# Patient Record
Sex: FEMALE | Race: OTHER | URBAN - METROPOLITAN AREA
[De-identification: names, ages, dates, MRNs, and addresses within clinical notes are randomized per-mention and may not be internally consistent; named-entity substitution may affect disease eponyms.]

---

## 2017-04-29 ENCOUNTER — HOSPITAL ENCOUNTER (OUTPATIENT)
Dept: MAMMOGRAPHY | Age: 57
Discharge: HOME | End: 2017-04-29
Attending: FAMILY MEDICINE
Payer: COMMERCIAL

## 2017-04-29 DIAGNOSIS — Z12.31 ENCOUNTER FOR SCREENING MAMMOGRAM FOR MALIGNANT NEOPLASM OF BREAST: ICD-10-CM

## 2017-04-29 PROCEDURE — 77067 SCR MAMMO BI INCL CAD: CPT

## 2017-05-16 ENCOUNTER — HOSPITAL ENCOUNTER (OUTPATIENT)
Dept: MAMMOGRAPHY | Facility: HOSPITAL | Age: 57
Discharge: HOME | End: 2017-05-16
Attending: FAMILY MEDICINE
Payer: COMMERCIAL

## 2017-05-16 DIAGNOSIS — R92.8 ABNORMAL MAMMOGRAM: ICD-10-CM

## 2017-05-16 DIAGNOSIS — R92.8 ABNORMAL MAMMOGRAM OF LEFT BREAST: ICD-10-CM

## 2017-05-16 PROCEDURE — 77065 DX MAMMO INCL CAD UNI: CPT | Performed by: FAMILY MEDICINE

## 2017-05-16 PROCEDURE — 77061 BREAST TOMOSYNTHESIS UNI: CPT | Performed by: FAMILY MEDICINE

## 2017-05-16 PROCEDURE — 76642 ULTRASOUND BREAST LIMITED: CPT | Performed by: FAMILY MEDICINE

## 2018-03-13 ENCOUNTER — HOSPITAL (OUTPATIENT)
Dept: OTHER | Age: 58
End: 2018-03-13
Attending: FAMILY MEDICINE

## 2018-04-02 ENCOUNTER — HOSPITAL (OUTPATIENT)
Dept: OTHER | Age: 58
End: 2018-04-02
Attending: FAMILY MEDICINE

## 2024-05-03 ENCOUNTER — HOSPITAL ENCOUNTER (OUTPATIENT)
Age: 64
Discharge: HOME | End: 2024-05-03
Payer: COMMERCIAL

## 2024-05-03 VITALS
OXYGEN SATURATION: 99 % | HEART RATE: 93 BPM | SYSTOLIC BLOOD PRESSURE: 94 MMHG | RESPIRATION RATE: 18 BRPM | TEMPERATURE: 98 F | WEIGHT: 236 LBS | DIASTOLIC BLOOD PRESSURE: 75 MMHG | BODY MASS INDEX: 39 KG/M2

## 2024-05-03 DIAGNOSIS — N76.2 CELLULITIS OF LABIA: Primary | ICD-10-CM

## 2024-05-03 PROCEDURE — 99204 OFFICE O/P NEW MOD 45 MIN: CPT

## 2024-05-03 PROCEDURE — 99203 OFFICE O/P NEW LOW 30 MIN: CPT

## 2024-05-03 RX ORDER — SULFAMETHOXAZOLE AND TRIMETHOPRIM 800; 160 MG/1; MG/1
1 TABLET ORAL 2 TIMES DAILY
Qty: 14 TABLET | Refills: 0 | Status: SHIPPED | OUTPATIENT
Start: 2024-05-03 | End: 2024-05-10

## 2024-05-03 RX ORDER — ATORVASTATIN CALCIUM 20 MG/1
20 TABLET, FILM COATED ORAL NIGHTLY
COMMUNITY
Start: 2023-11-24

## 2024-05-03 RX ORDER — CEPHALEXIN 500 MG/1
500 CAPSULE ORAL 4 TIMES DAILY
Qty: 28 CAPSULE | Refills: 0 | Status: SHIPPED | OUTPATIENT
Start: 2024-05-03 | End: 2024-05-10

## 2024-05-03 NOTE — DISCHARGE INSTRUCTIONS
Please take both antibiotics as prescribed.  Warm sits baths and Epsom salt.  Have a wound check with your primary in 3 days.  ER if worse.

## 2024-05-03 NOTE — ED PROVIDER NOTES
Patient Seen in: [facility]      History     Chief Complaint   Patient presents with    Eval-G     Stated Complaint: Eval - G    Subjective:   This is a 63-year-old female with a history of type 2 diabetes, GERD, and hyperlipidemia.  Presents to immediate care for painful bump on her left labia.  Reports she notices painful bump and use an over-the-counter cream.  She attempted to squeeze the area.  Pain and redness became worse.  Noted some brown watery drainage after squeezing.  No fevers.  No vaginal bleeding or vaginal discharge.  No concern for STI.  No other treatment attempted prior to arrival.     The history is provided by the patient. The history is limited by a language barrier. A  was used.           Objective:   Past Medical History:    Diabetes (HCC)    Esophageal reflux    Hyperlipidemia              Past Surgical History:   Procedure Laterality Date    Cholecystectomy      Thyroidectomy                  Social History     Socioeconomic History    Marital status:    Tobacco Use    Smoking status: Never    Smokeless tobacco: Never   Vaping Use    Vaping status: Never Used   Substance and Sexual Activity    Alcohol use: Never    Drug use: Never     Social Determinants of Health     Financial Resource Strain: Not on File (10/7/2022)    Received from [facility]     Financial Resource Strain     Financial Resource Strain: 0   Food Insecurity: Declined (11/22/2023)    Received from [facility]     Food Insecurity     Food: 99   Transportation Needs: Not on File (10/7/2022)    Received from [facility]     Transportation Needs     Transportation: 0   Physical Activity: Not on File (10/7/2022)    Received from [facility]     Physical Activity     Physical Activity: 0   Stress: Not on File (10/7/2022)    Received from [facility]     Stress     Stress: 0   Social Connections: Not on File (10/7/2022)    Received from [facility]     Social Connections     Social Connections and Isolation: 0   Housing  Stability: Declined (2023)    [facility]     [facility] [place] Stability     Housin              Review of Systems   Constitutional:  Negative for chills and fever.   HENT:  Negative for congestion and sore throat.    Respiratory:  Negative for cough, shortness of breath and wheezing.    Cardiovascular:  Negative for chest pain, palpitations and leg swelling.   Gastrointestinal:  Negative for abdominal pain, constipation, diarrhea, nausea and vomiting.   Genitourinary:  Negative for dysuria, frequency, hematuria, vaginal bleeding and vaginal discharge.   Musculoskeletal:  Negative for back pain, neck pain and neck stiffness.   Skin:  Positive for wound.   Allergic/Immunologic: Negative for environmental allergies.   Neurological:  Negative for headaches.   Hematological:  Does not bruise/bleed easily.       Positive for stated complaint: Eval - G  Other systems are as noted in HPI.  Constitutional and vital signs reviewed.      All other systems reviewed and negative except as noted above.    Physical Exam     ED Triage Vitals [24 1022]   BP 94/75   Pulse 93   Resp 18   Temp 98.1 °F (36.7 °C)   Temp src Temporal   SpO2 99 %   O2 Device None (Room air)       Current:BP 94/75   Pulse 93   Temp 98.1 °F (36.7 °C) (Temporal)   Resp 18   Wt 107 kg   SpO2 99%   BMI 39.27 kg/m²         Physical Exam  Vitals and nursing note reviewed. Exam conducted with a chaperone present.   Constitutional:       General: She is not in acute distress.     Appearance: Normal appearance. She is obese. She is not ill-appearing, toxic-appearing or diaphoretic.   HENT:      Head: Normocephalic and atraumatic.      Right Ear: External ear normal.      Left Ear: External ear normal.      Nose: Nose normal.      Mouth/Throat:      Mouth: Mucous membranes are moist.      Pharynx: Oropharynx is clear.   Eyes:      General:         Right eye: No discharge.         Left eye: No discharge.      Extraocular Movements: Extraocular  movements intact.      Conjunctiva/sclera: Conjunctivae normal.   Cardiovascular:      Rate and Rhythm: Normal rate.   Pulmonary:      Effort: Pulmonary effort is normal.   Genitourinary:     Exam position: Supine.      Pubic Area: No rash or pubic lice.       Labia:         Right: No rash, tenderness, lesion or injury.         Left: Tenderness present. No rash, lesion or injury.        Musculoskeletal:      Cervical back: Neck supple.      Right lower leg: No edema.      Left lower leg: No edema.   Skin:     General: Skin is warm and dry.      Capillary Refill: Capillary refill takes less than 2 seconds.      Findings: No rash.   Neurological:      Mental Status: She is alert and oriented to person, place, and time.   Psychiatric:         Mood and Affect: Mood normal.         Behavior: Behavior normal.               ED Course   Labs Reviewed - No data to display          DC home.          MDM      Vital signs stable.  Patient is well-appearing and nontoxic looking.  Presents to immediate care for left labial pain and swelling.    Differential diagnosis includes but is not limited to Bartholin's gland cyst, cellulitis, lesion, HSV    Chaperoned exam by nursing staff.  There is what appears to be a developing cellulitis on the left labia.  No discrete abscess.  No drainage from the area.  Patient states she has no new sexual partners or concern for STI.    DC home.  Dual antibiotic therapy prescribed.  Recommended warm sitz bath.  Close follow-up with her primary in 3 days for wound check.  Reasons to seek emergent reevaluation reviewed.    Assessment, plan of care, and all discharge instructions personally discussed with patient via hospital certified .  All questions answered.                             Medical Decision Making      Disposition and Plan     Clinical Impression:  1. Cellulitis of labia         Disposition:  Discharge  5/3/2024 10:59 am    Follow-up:  Tawny Romo,  MD  [address] [place] [id number]  [phone]    In 3 days  For wound re-check          Medications Prescribed:  Current Discharge Medication List        START taking these medications    Details   sulfamethoxazole-trimethoprim -160 MG Oral Tab per tablet Take 1 tablet by mouth 2 (two) times daily for 7 days.  Qty: 14 tablet, Refills: 0      cephalexin 500 MG Oral Cap Take 1 capsule (500 mg total) by mouth 4 (four) times daily for 7 days.  Qty: 28 capsule, Refills: 0

## 2024-05-03 NOTE — ED INITIAL ASSESSMENT (HPI)
C/o onset Monday bump on vagina/labial area. States \"applied over the counter cream brought from [facility] [employer] and tried squeezing,  a lot of brownish pus came out and watery blood.\"

## 2024-06-08 ENCOUNTER — OFFICE VISIT (OUTPATIENT)
Dept: INTERNAL MEDICINE CLINIC | Facility: CLINIC | Age: 64
End: 2024-06-08
Payer: COMMERCIAL

## 2024-06-08 VITALS
SYSTOLIC BLOOD PRESSURE: 128 MMHG | WEIGHT: 241.81 LBS | RESPIRATION RATE: 15 BRPM | DIASTOLIC BLOOD PRESSURE: 70 MMHG | HEART RATE: 74 BPM | HEIGHT: 63.9 IN | OXYGEN SATURATION: 96 % | BODY MASS INDEX: 41.79 KG/M2 | TEMPERATURE: 99 F

## 2024-06-08 DIAGNOSIS — E78.5 HYPERLIPIDEMIA, UNSPECIFIED HYPERLIPIDEMIA TYPE: ICD-10-CM

## 2024-06-08 DIAGNOSIS — Z23 IMMUNIZATION DUE: ICD-10-CM

## 2024-06-08 DIAGNOSIS — Z12.31 ENCOUNTER FOR SCREENING MAMMOGRAM FOR MALIGNANT NEOPLASM OF BREAST: ICD-10-CM

## 2024-06-08 DIAGNOSIS — Z12.11 COLON CANCER SCREENING: ICD-10-CM

## 2024-06-08 DIAGNOSIS — M25.562 PAIN IN BOTH KNEES, UNSPECIFIED CHRONICITY: ICD-10-CM

## 2024-06-08 DIAGNOSIS — Z01.89 ROUTINE LAB DRAW: ICD-10-CM

## 2024-06-08 DIAGNOSIS — M25.561 PAIN IN BOTH KNEES, UNSPECIFIED CHRONICITY: ICD-10-CM

## 2024-06-08 DIAGNOSIS — E28.319 EARLY MENOPAUSE: ICD-10-CM

## 2024-06-08 DIAGNOSIS — E11.65 TYPE 2 DIABETES MELLITUS WITH HYPERGLYCEMIA, UNSPECIFIED WHETHER LONG TERM INSULIN USE (HCC): Primary | ICD-10-CM

## 2024-06-08 DIAGNOSIS — H91.91 HEARING DECREASED, RIGHT: ICD-10-CM

## 2024-06-08 DIAGNOSIS — M17.10 ARTHRITIS OF KNEE: ICD-10-CM

## 2024-06-08 PROBLEM — K04.7 PERIAPICAL ABSCESS WITHOUT SINUS: Status: RESOLVED | Noted: 2020-03-18 | Resolved: 2024-06-08

## 2024-06-08 PROBLEM — R14.0 ABDOMINAL BLOATING: Status: RESOLVED | Noted: 2022-07-21 | Resolved: 2024-06-08

## 2024-06-08 PROBLEM — H93.11 TINNITUS OF RIGHT EAR: Status: ACTIVE | Noted: 2021-02-10

## 2024-06-08 PROBLEM — M19.90 OSTEOARTHROSIS: Status: ACTIVE | Noted: 2020-05-30

## 2024-06-08 PROBLEM — K52.9 INFLAMMATION OF STOMACH AND INTESTINE: Status: RESOLVED | Noted: 2022-07-21 | Resolved: 2024-06-08

## 2024-06-08 PROBLEM — H91.8X1 OTHER SPECIFIED HEARING LOSS, RIGHT EAR: Status: ACTIVE | Noted: 2021-02-10

## 2024-06-08 PROBLEM — R03.0 ELEVATED BLOOD-PRESSURE READING, WITHOUT DIAGNOSIS OF HYPERTENSION: Status: ACTIVE | Noted: 2020-03-18

## 2024-06-08 PROBLEM — R14.0 ABDOMINAL BLOATING: Status: ACTIVE | Noted: 2022-07-21

## 2024-06-08 PROBLEM — R03.0 ELEVATED BLOOD-PRESSURE READING, WITHOUT DIAGNOSIS OF HYPERTENSION: Status: RESOLVED | Noted: 2020-03-18 | Resolved: 2024-06-08

## 2024-06-08 PROBLEM — E11.9 TYPE 2 DIABETES MELLITUS WITHOUT COMPLICATIONS (HCC): Status: ACTIVE | Noted: 2022-06-15

## 2024-06-08 PROBLEM — K04.7 PERIAPICAL ABSCESS WITHOUT SINUS: Status: ACTIVE | Noted: 2020-03-18

## 2024-06-08 PROBLEM — K52.9 INFLAMMATION OF STOMACH AND INTESTINE: Status: ACTIVE | Noted: 2022-07-21

## 2024-06-08 PROCEDURE — 90715 TDAP VACCINE 7 YRS/> IM: CPT | Performed by: INTERNAL MEDICINE

## 2024-06-08 PROCEDURE — 90677 PCV20 VACCINE IM: CPT | Performed by: INTERNAL MEDICINE

## 2024-06-08 PROCEDURE — 99204 OFFICE O/P NEW MOD 45 MIN: CPT | Performed by: INTERNAL MEDICINE

## 2024-06-08 PROCEDURE — 90471 IMMUNIZATION ADMIN: CPT | Performed by: INTERNAL MEDICINE

## 2024-06-08 PROCEDURE — 90472 IMMUNIZATION ADMIN EACH ADD: CPT | Performed by: INTERNAL MEDICINE

## 2024-06-08 NOTE — H&P
Subjective:   Rubi Lee is a 63 year old female  who presents for Establish Care     DM2- on metformin   Reports fasting BG ~120s-130s  HLD- on statin   Constipation - improved per pt. Takes fiber.   Saw OSH GI-was supposed to get Cscope in 2/2024 but insurance denied it. Since then she has new insurance.   Due for eye exam- given referral.     Denies family hx of breast or colon cancer.  Denies breast or nipple changes.     Reports entering menopause in her late 30s     Reports decreased hearing on R since 2020/2021  Saw ENT and audiology in 2/2021     Has severe BL knee pain when starts to walk. Worse on R.     History/Other:    Chief Complaint Reviewed and Verified  No Further Nursing Notes to   Review  Tobacco Reviewed  Allergies Reviewed  Medications Reviewed    Medical History Reviewed  Surgical History Reviewed  OB Status Reviewed    Family History Reviewed  Social History Reviewed         Current Outpatient Medications   Medication Sig Dispense Refill    atorvastatin 20 MG Oral Tab Take 0.5 tablets (10 mg total) by mouth nightly.      metFORMIN 500 MG Oral Tab Take 1 tablet (500 mg total) by mouth daily.         Review of Systems:  Pertinent items are noted in HPI.  A comprehensive 10 point review of systems was completed.  Pertinent positives and negatives noted in the the HPI.        Objective:   /70 (BP Location: Right arm, Patient Position: Sitting, Cuff Size: adult)   Pulse 74   Temp 98.5 °F (36.9 °C) (Temporal)   Resp 15   Ht 5' 3.9\" (1.623 m)   Wt 241 lb 12.8 oz (109.7 kg)   SpO2 96%   BMI 41.63 kg/m²  Estimated body mass index is 41.63 kg/m² as calculated from the following:    Height as of this encounter: 5' 3.9\" (1.623 m).    Weight as of this encounter: 241 lb 12.8 oz (109.7 kg).  PHYSICAL EXAM:   General: no acute distress   Respiratory: no increased work of breathing; good air exchange; CTAB; no crackles or wheezing   Cardiovascular: RRR; S1, S2; no murmurs; no  carotid bruits appreciated; no edema   Gastrointestinal: normal bowel sounds;   Neurological: awake, alert, oriented x3; CNII-XII grossly intact;   MSK: BL knee pain-affects walking   Behavioral/Psych: euthymic; appropriate affect   Bilateral barefoot skin diabetic exam is normal, visualized feet and the appearance is normal.  Bilateral monofilament/sensation of both feet is normal.  Pulsation pedal pulse exam of both lower legs/feet is normal as well.        Assessment & Plan:   Rubi was seen today for establish care.    Diagnoses and all orders for this visit:    Type 2 diabetes mellitus with hyperglycemia, unspecified whether long term insulin use (HCC)  -     Vitamin D; Future  -     CBC With Differential With Platelet; Future  -     Comp Metabolic Panel (14); Future  -     Hemoglobin A1C; Future  -     Lipid Panel; Future  -     Microalb/Creat Ratio, Random Urine [E]; Future  -     Ophthalmology Referral - In Network  -     TSH and Free T4 [E]; Future  -on metformin     Encounter for screening mammogram for malignant neoplasm of breast  -     [facility] BEBA 2D+3D SCREENING BILAT (CPT=77067/24365); Future    Hyperlipidemia, unspecified hyperlipidemia type  -     Vitamin D; Future  -     CBC With Differential With Platelet; Future  -     Comp Metabolic Panel (14); Future  -     Hemoglobin A1C; Future  -     Lipid Panel; Future  -     TSH and Free T4 [E]; Future  -on statin     Colon cancer screening  -     Gastro Referral - In Network    Early menopause  -     Vitamin D; Future  -     TSH and Free T4 [E]; Future  -     XR DEXA BONE DENSITOMETRY (CPT=77080); Future    Routine lab draw  -     [facility] BEBA 2D+3D SCREENING BILAT (CPT=77067/06067); Future  -     Vitamin D; Future  -     CBC With Differential With Platelet; Future  -     Comp Metabolic Panel (14); Future  -     Hemoglobin A1C; Future  -     Lipid Panel; Future  -     Microalb/Creat Ratio, Random Urine [E]; Future  -     TSH and Free T4 [E]; Future    Hearing  decreased, right  -     ENT Referral - In Network    Arthritis of knee  Pain in both knees, unspecified chronicity  -     XR Knee (non-replaced) left complete (4 or more views)- [facility] Only; Future  -     XR Knee (non-replaced) right complete (4 or more views) - [facility] Only; Future  -     Ortho Referral - In Network    Immunization due  -     Prevnar 20 (PCV20) [14506]  -     TdaP (Adacel, Boostrix) [08035]                Amber Stroud MD

## 2024-06-10 ENCOUNTER — LAB ENCOUNTER (OUTPATIENT)
Dept: LAB | Age: 64
End: 2024-06-10
Attending: INTERNAL MEDICINE
Payer: COMMERCIAL

## 2024-06-10 ENCOUNTER — OFFICE VISIT (OUTPATIENT)
Dept: INTERNAL MEDICINE CLINIC | Facility: CLINIC | Age: 64
End: 2024-06-10
Payer: COMMERCIAL

## 2024-06-10 VITALS
HEART RATE: 99 BPM | DIASTOLIC BLOOD PRESSURE: 76 MMHG | SYSTOLIC BLOOD PRESSURE: 130 MMHG | WEIGHT: 240.81 LBS | BODY MASS INDEX: 41 KG/M2 | TEMPERATURE: 98 F | OXYGEN SATURATION: 96 % | RESPIRATION RATE: 17 BRPM

## 2024-06-10 DIAGNOSIS — Z01.89 ROUTINE LAB DRAW: ICD-10-CM

## 2024-06-10 DIAGNOSIS — E28.319 EARLY MENOPAUSE: ICD-10-CM

## 2024-06-10 DIAGNOSIS — N76.4 VULVAR ABSCESS: Primary | ICD-10-CM

## 2024-06-10 DIAGNOSIS — E78.5 HYPERLIPIDEMIA, UNSPECIFIED HYPERLIPIDEMIA TYPE: ICD-10-CM

## 2024-06-10 DIAGNOSIS — E11.65 TYPE 2 DIABETES MELLITUS WITH HYPERGLYCEMIA, UNSPECIFIED WHETHER LONG TERM INSULIN USE (HCC): ICD-10-CM

## 2024-06-10 LAB
ALBUMIN SERPL-MCNC: 4.6 G/DL (ref 3.2–4.8)
ALBUMIN/GLOB SERPL: 1.4 {RATIO} (ref 1–2)
ALP LIVER SERPL-CCNC: 106 U/L
ALT SERPL-CCNC: 19 U/L
ANION GAP SERPL CALC-SCNC: 8 MMOL/L (ref 0–18)
AST SERPL-CCNC: 24 U/L (ref ?–34)
BASOPHILS # BLD AUTO: 0.08 X10(3) UL (ref 0–0.2)
BASOPHILS NFR BLD AUTO: 0.5 %
BILIRUB SERPL-MCNC: 0.4 MG/DL (ref 0.2–1.1)
BUN BLD-MCNC: 12 MG/DL (ref 9–23)
BUN/CREAT SERPL: 15.4 (ref 10–20)
CALCIUM BLD-MCNC: 9.2 MG/DL (ref 8.7–10.4)
CHLORIDE SERPL-SCNC: 109 MMOL/L (ref 98–112)
CHOLEST SERPL-MCNC: 142 MG/DL (ref ?–200)
CO2 SERPL-SCNC: 25 MMOL/L (ref 21–32)
CREAT BLD-MCNC: 0.78 MG/DL
CREAT UR-SCNC: 139.2 MG/DL
DEPRECATED RDW RBC AUTO: 43 FL (ref 35.1–46.3)
EGFRCR SERPLBLD CKD-EPI 2021: 86 ML/MIN/1.73M2 (ref 60–?)
EOSINOPHIL # BLD AUTO: 0.1 X10(3) UL (ref 0–0.7)
EOSINOPHIL NFR BLD AUTO: 0.7 %
ERYTHROCYTE [DISTWIDTH] IN BLOOD BY AUTOMATED COUNT: 13.1 % (ref 11–15)
EST. AVERAGE GLUCOSE BLD GHB EST-MCNC: 151 MG/DL (ref 68–126)
FASTING PATIENT LIPID ANSWER: YES
FASTING STATUS PATIENT QL REPORTED: YES
GLOBULIN PLAS-MCNC: 3.3 G/DL (ref 2–3.5)
GLUCOSE BLD-MCNC: 136 MG/DL (ref 70–99)
HBA1C MFR BLD: 6.9 % (ref ?–5.7)
HCT VFR BLD AUTO: 43.9 %
HDLC SERPL-MCNC: 43 MG/DL (ref 40–59)
HGB BLD-MCNC: 15.1 G/DL
IMM GRANULOCYTES # BLD AUTO: 0.06 X10(3) UL (ref 0–1)
IMM GRANULOCYTES NFR BLD: 0.4 %
LDLC SERPL CALC-MCNC: 77 MG/DL (ref ?–100)
LYMPHOCYTES # BLD AUTO: 4.95 X10(3) UL (ref 1–4)
LYMPHOCYTES NFR BLD AUTO: 33.1 %
MCH RBC QN AUTO: 31 PG (ref 26–34)
MCHC RBC AUTO-ENTMCNC: 34.4 G/DL (ref 31–37)
MCV RBC AUTO: 90.1 FL
MICROALBUMIN UR-MCNC: 1 MG/DL
MICROALBUMIN/CREAT 24H UR-RTO: 7.2 UG/MG (ref ?–30)
MONOCYTES # BLD AUTO: 0.78 X10(3) UL (ref 0.1–1)
MONOCYTES NFR BLD AUTO: 5.2 %
NEUTROPHILS # BLD AUTO: 8.97 X10 (3) UL (ref 1.5–7.7)
NEUTROPHILS # BLD AUTO: 8.97 X10(3) UL (ref 1.5–7.7)
NEUTROPHILS NFR BLD AUTO: 60.1 %
NONHDLC SERPL-MCNC: 99 MG/DL (ref ?–130)
OSMOLALITY SERPL CALC.SUM OF ELEC: 296 MOSM/KG (ref 275–295)
PLATELET # BLD AUTO: 231 10(3)UL (ref 150–450)
POTASSIUM SERPL-SCNC: 4.3 MMOL/L (ref 3.5–5.1)
PROT SERPL-MCNC: 7.9 G/DL (ref 5.7–8.2)
RBC # BLD AUTO: 4.87 X10(6)UL
SODIUM SERPL-SCNC: 142 MMOL/L (ref 136–145)
T4 FREE SERPL-MCNC: 1 NG/DL (ref 0.8–1.7)
TRIGL SERPL-MCNC: 123 MG/DL (ref 30–149)
TSI SER-ACNC: 2.58 MIU/ML (ref 0.55–4.78)
VIT D+METAB SERPL-MCNC: 25 NG/ML (ref 30–100)
VLDLC SERPL CALC-MCNC: 19 MG/DL (ref 0–30)
WBC # BLD AUTO: 14.9 X10(3) UL (ref 4–11)

## 2024-06-10 PROCEDURE — 82570 ASSAY OF URINE CREATININE: CPT

## 2024-06-10 PROCEDURE — 85025 COMPLETE CBC W/AUTO DIFF WBC: CPT

## 2024-06-10 PROCEDURE — 80061 LIPID PANEL: CPT

## 2024-06-10 PROCEDURE — 83036 HEMOGLOBIN GLYCOSYLATED A1C: CPT

## 2024-06-10 PROCEDURE — 84443 ASSAY THYROID STIM HORMONE: CPT

## 2024-06-10 PROCEDURE — 82306 VITAMIN D 25 HYDROXY: CPT

## 2024-06-10 PROCEDURE — 82043 UR ALBUMIN QUANTITATIVE: CPT

## 2024-06-10 PROCEDURE — 80053 COMPREHEN METABOLIC PANEL: CPT

## 2024-06-10 PROCEDURE — 84439 ASSAY OF FREE THYROXINE: CPT

## 2024-06-10 PROCEDURE — 99213 OFFICE O/P EST LOW 20 MIN: CPT | Performed by: INTERNAL MEDICINE

## 2024-06-10 PROCEDURE — 36415 COLL VENOUS BLD VENIPUNCTURE: CPT

## 2024-06-10 RX ORDER — CEPHALEXIN 500 MG/1
500 CAPSULE ORAL EVERY 6 HOURS
Qty: 56 CAPSULE | Refills: 0 | Status: SHIPPED | OUTPATIENT
Start: 2024-06-10 | End: 2024-06-24

## 2024-06-10 RX ORDER — CLINDAMYCIN PHOSPHATE 10 MG/G
1 GEL TOPICAL 2 TIMES DAILY
Qty: 45 G | Refills: 0 | Status: SHIPPED | OUTPATIENT
Start: 2024-06-10 | End: 2024-06-24

## 2024-06-10 NOTE — PROGRESS NOTES
Subjective:   Rubi Lee is a 63 year old female  who presents for Abscess (Groin Area/)     Today reports that she has R groin infection that started Saturday.   Has tried topical agents at home.   Denies f/c/chills/rash.     History/Other:    Chief Complaint Reviewed and Verified  Nursing Notes Reviewed and   Verified  Tobacco Reviewed  Allergies Reviewed  Medications Reviewed    Medical History Reviewed  Surgical History Reviewed  OB Status Reviewed    Family History Reviewed  Social History Reviewed         Current Outpatient Medications   Medication Sig Dispense Refill    atorvastatin 20 MG Oral Tab Take 0.5 tablets (10 mg total) by mouth nightly.      metFORMIN 500 MG Oral Tab Take 1 tablet (500 mg total) by mouth daily.         Review of Systems:  Pertinent items are noted in HPI.  A comprehensive 10 point review of systems was completed.  Pertinent positives and negatives noted in the the HPI.        Objective:   /76 (BP Location: Left arm, Patient Position: Sitting, Cuff Size: large)   Pulse 99   Temp 97.9 °F (36.6 °C) (Temporal)   Resp 17   Wt 240 lb 12.8 oz (109.2 kg)   SpO2 96%   BMI 41.46 kg/m²  Estimated body mass index is 41.46 kg/m² as calculated from the following:    Height as of 6/8/24: 5' 3.9\" (1.623 m).    Weight as of this encounter: 240 lb 12.8 oz (109.2 kg).  PHYSICAL EXAM:   General: no acute distress   Respiratory: no increased work of breathing; Neurological: awake, alert, oriented x3; CNII-XII grossly intact;   Behavioral/Psych: euthymic; appropriate affect   : L labia localized folliculitis -per pt starting to improve sightly with home tx    Assessment & Plan:   Rubi was seen today for abscess.    Diagnoses and all orders for this visit:    Vulvar folliculitis/abscess  -     [facility]G Referral - In Network  -     cephalexin 500 MG Oral Cap; Take 1 capsule (500 mg total) by mouth every 6 (six) hours for 14 days. (Per pt has responded well to this in the past)  -      Clindamycin Phosphate 1 % External Gel; Apply 1 Application topically 2 (two) times daily for 14 days.  Discussed when to seek urgent medical attention; voiced understanding                   Amber Stroud MD

## 2024-06-28 ENCOUNTER — TELEPHONE (OUTPATIENT)
Facility: CLINIC | Age: 64
End: 2024-06-28

## 2024-06-28 DIAGNOSIS — M25.561 PAIN IN BOTH KNEES, UNSPECIFIED CHRONICITY: Primary | ICD-10-CM

## 2024-06-28 DIAGNOSIS — M25.562 PAIN IN BOTH KNEES, UNSPECIFIED CHRONICITY: Primary | ICD-10-CM

## 2024-07-03 ENCOUNTER — OFFICE VISIT (OUTPATIENT)
Dept: ORTHOPEDICS CLINIC | Facility: CLINIC | Age: 64
End: 2024-07-03
Payer: COMMERCIAL

## 2024-07-03 ENCOUNTER — HOSPITAL ENCOUNTER (OUTPATIENT)
Dept: GENERAL RADIOLOGY | Age: 64
Discharge: HOME | End: 2024-07-03
Attending: PHYSICIAN ASSISTANT
Payer: COMMERCIAL

## 2024-07-03 VITALS — HEIGHT: 63.9 IN | BODY MASS INDEX: 41.62 KG/M2 | WEIGHT: 240.81 LBS

## 2024-07-03 DIAGNOSIS — M17.0 PRIMARY OSTEOARTHRITIS OF BOTH KNEES: Primary | ICD-10-CM

## 2024-07-03 DIAGNOSIS — M25.562 PAIN IN BOTH KNEES, UNSPECIFIED CHRONICITY: ICD-10-CM

## 2024-07-03 DIAGNOSIS — M25.561 PAIN IN BOTH KNEES, UNSPECIFIED CHRONICITY: ICD-10-CM

## 2024-07-03 PROCEDURE — 99204 OFFICE O/P NEW MOD 45 MIN: CPT | Performed by: PHYSICIAN ASSISTANT

## 2024-07-03 PROCEDURE — 73564 X-RAY EXAM KNEE 4 OR MORE: CPT | Performed by: PHYSICIAN ASSISTANT

## 2024-07-03 PROCEDURE — 20610 DRAIN/INJ JOINT/BURSA W/O US: CPT | Performed by: PHYSICIAN ASSISTANT

## 2024-07-03 RX ORDER — TRIAMCINOLONE ACETONIDE 40 MG/ML
80 INJECTION, SUSPENSION INTRA-ARTICULAR; INTRAMUSCULAR ONCE
Status: COMPLETED | OUTPATIENT
Start: 2024-07-03 | End: 2024-07-03

## 2024-07-03 RX ADMIN — TRIAMCINOLONE ACETONIDE 80 MG: 40 INJECTION, SUSPENSION INTRA-ARTICULAR; INTRAMUSCULAR at 12:47:00

## 2024-07-03 NOTE — H&P
[facility] New Patient Note    CC:   Chief Complaint   Patient presents with    Knee Pain     Bilateral knee pain;   Right is worse;   Onset: 5 years  Pain Score: R 8-10, L6       HPI: This 63 year old female presents today with complaints of right greater than left bilateral knee pain.  She reports the pain has been ongoing for the last 5 years.  She has previously undergone injections, she reports gel injection once about 3 years ago which did help for a significant amount of time and and also PRP injection which offered no relief.  She does not take any medications for pain nor she has she undergone any surgeries.  She feels pain diffusely around both knees that worsens with increased activity.    Past Medical History:    Diabetes (HCC)    Esophageal reflux    Hyperlipidemia     Past Surgical History:   Procedure Laterality Date    Cholecystectomy      Thyroidectomy      partial per pt- reports done ~2000. denies cancer hx     Current Outpatient Medications   Medication Sig Dispense Refill    atorvastatin 20 MG Oral Tab Take 0.5 tablets (10 mg total) by mouth nightly.      metFORMIN 500 MG Oral Tab Take 1 tablet (500 mg total) by mouth daily.       No Known Allergies  Family History   Problem Relation Age of Onset    Cancer Mother     Stroke Paternal Grandfather     Heart Disease Neg      Social History     Occupational History    Not on file   Tobacco Use    Smoking status: Never    Smokeless tobacco: Never   Vaping Use    Vaping status: Never Used   Substance and Sexual Activity    Alcohol use: Never    Drug use: Never    Sexual activity: Not on file        ROS:  Comprehensive system review obtained and negative except as mentioned above    Physical Exam:    Ht 5' 3.9\" (1.623 m)   Wt 240 lb 12.8 oz (109.2 kg)   BMI 41.46 kg/m²   Constitutional: Awake, alert, no distress.   Psychological: Appropriate affect.  Respiratory: Unlabored breathing.  Bilateral lower extremity:  Inspection: skin is intact  without any redness or deformity. No appreciable effusion.   Palpation: Tender to palpation along the medial and lateral joint lines of both knees.  Range of motion: Knee can extend to 0 and flex to approximately 120 degrees.  Knee is stable to valgus and varus stress at 0 and 30 degrees. No laxity with anterior or posterior drawer.   Neuromuscular: Strength is normal and sensation is intact.  Vascular: Extremities are warm and well-perfused.  Lymph: Unremarkable.    Imaging: Imaging was personally viewed, independently interpreted and radiology report read.  Radiographs of both knees obtained today demonstrates moderate medial compartmental joint space narrowing of the right knee and mild to moderate medial compartmental joint space narrowing of the left knee.  There is osteophytic lipping along both medial femoral condyles and spurring along the medial and lateral tibial border.  Severe patellofemoral joint space narrowing bilaterally.    Assessment/Plan:  Diagnoses and all orders for this visit:    Primary osteoarthritis of both knees  -     Large joint - bilateral aspiration/injection  -     triamcinolone acetonide (Kenalog-40) 40 MG/ML injection 80 mg      Assessment: 63-year-old female with symptomatic radiographically moderate bilateral knee osteoarthritis, severe in the patellofemoral compartments    Plan: I discussed the etiology, natural history, and management options for symptomatic knee osteoarthritis.  I discussed nonsurgical and surgical treatments, with nonsurgical treatments to include anti-inflammatory medications, injections, activity modification, weight loss, low impact exercise and possible therapy.  Surgery would be with knee replacement and is an elective operation reserved for when nonsurgical treatments no longer alleviate symptoms sufficiently.  Patient would like to maximize nonsurgical treatments.  She did expressed interest in receiving steroid injections in the both knees in clinic  today.  I discussed using the gel injections as a backup option if the steroid injections were to fail to offer significant relief.  Patient understands and is agreeable to the plan.  Her questions were sought and answered satisfactorily.  She is happy with the plan and will follow as advised.      Joe Andersen PA-C  [facility] Orth[facility]    This note was dictated using Dragon software.  While it was briefly proofread prior to completion, some grammatical, spelling, and word choice errors due to dictation may still occur.

## 2024-07-03 NOTE — PROCEDURES
Risks and benefits of knee injection discussed with the patient, with risks including but not limited to pain and swelling at the injection site and/or within the knee joint, infection, elevation in blood pressure and/or glucose levels, facial flushing. After informed consent, the patient's right and left knees were marked, locally anesthetized with skin refrigerant, prepped with topical antiseptic, and injected with a mixture of 1mL 40mg/mL Kenalog, 2mL 1% lidocaine and 2mL 0.5% marcaine through the inferolateral portal.  A band-aid was applied.  The patient tolerated the procedure well.    Joe Andersen PA-C  [facility] Orthopedic Surgery

## 2024-07-09 ENCOUNTER — OFFICE VISIT (OUTPATIENT)
Dept: INTERNAL MEDICINE CLINIC | Facility: CLINIC | Age: 64
End: 2024-07-09
Payer: COMMERCIAL

## 2024-07-09 VITALS
OXYGEN SATURATION: 94 % | TEMPERATURE: 99 F | RESPIRATION RATE: 17 BRPM | BODY MASS INDEX: 41 KG/M2 | HEART RATE: 97 BPM | WEIGHT: 236.81 LBS | SYSTOLIC BLOOD PRESSURE: 120 MMHG | DIASTOLIC BLOOD PRESSURE: 66 MMHG

## 2024-07-09 DIAGNOSIS — R06.09 DOE (DYSPNEA ON EXERTION): ICD-10-CM

## 2024-07-09 DIAGNOSIS — K29.60 REFLUX GASTRITIS: ICD-10-CM

## 2024-07-09 DIAGNOSIS — R07.89 ATYPICAL CHEST PAIN: Primary | ICD-10-CM

## 2024-07-09 DIAGNOSIS — R09.82 POST-NASAL DRIP: ICD-10-CM

## 2024-07-09 DIAGNOSIS — E11.65 TYPE 2 DIABETES MELLITUS WITH HYPERGLYCEMIA, WITHOUT LONG-TERM CURRENT USE OF INSULIN (HCC): ICD-10-CM

## 2024-07-09 LAB
ATRIAL RATE: 83 BPM
P AXIS: 50 DEGREES
P-R INTERVAL: 154 MS
Q-T INTERVAL: 350 MS
QRS DURATION: 86 MS
QTC CALCULATION (BEZET): 411 MS
R AXIS: 2 DEGREES
T AXIS: 58 DEGREES
VENTRICULAR RATE: 83 BPM

## 2024-07-09 PROCEDURE — 99214 OFFICE O/P EST MOD 30 MIN: CPT | Performed by: INTERNAL MEDICINE

## 2024-07-09 PROCEDURE — 93000 ELECTROCARDIOGRAM COMPLETE: CPT | Performed by: INTERNAL MEDICINE

## 2024-07-09 RX ORDER — PANTOPRAZOLE SODIUM 40 MG/1
40 TABLET, DELAYED RELEASE ORAL
Qty: 90 TABLET | Refills: 0 | Status: SHIPPED | OUTPATIENT
Start: 2024-07-09

## 2024-07-09 RX ORDER — FLUTICASONE PROPIONATE 50 MCG
2 SPRAY, SUSPENSION (ML) NASAL DAILY PRN
Qty: 18.2 ML | Refills: 0 | Status: SHIPPED | OUTPATIENT
Start: 2024-07-09

## 2024-07-09 NOTE — PROGRESS NOTES
Subjective:   Rubi Lee is a 63 year old female  who presents for Other (Sick /)     States she was treated for URI in [place] a couple weeks ago. Those symptoms resolved but still has phlegm like a post nasal drip. No sinus pain/f/c/sob/cough.   States that yesterday she ate a piece of [place] dry cheese and felt sick about 1 hour later. She reported having felt a chest pain//pressure. States she felt better after some tea.   Some nausea.  Denies v/diarrhea/sob.   Does have hx of reflux.   Hx of some MORALES vs deconditioning.     Reports that vulva folliculitis resolved completely.     Knee arthritis- reports knee injection recently     DM2- overall controlled on metformin   History/Other:    Chief Complaint Reviewed and Verified  Nursing Notes Reviewed and   Verified  Tobacco Reviewed  Allergies Reviewed  Medications Reviewed    OB Status Reviewed         Current Outpatient Medications   Medication Sig Dispense Refill    atorvastatin 20 MG Oral Tab Take 0.5 tablets (10 mg total) by mouth nightly.      metFORMIN 500 MG Oral Tab Take 1 tablet (500 mg total) by mouth daily.         Review of Systems:  Pertinent items are noted in HPI.  A comprehensive 10 point review of systems was completed.  Pertinent positives and negatives noted in the the HPI.        Objective:   /66 (BP Location: Right arm, Patient Position: Sitting, Cuff Size: large)   Pulse 97   Temp 98.9 °F (37.2 °C) (Temporal)   Wt 236 lb 12.8 oz (107.4 kg)   SpO2 94%   BMI 40.77 kg/m²  Estimated body mass index is 40.77 kg/m² as calculated from the following:    Height as of 7/3/24: 5' 3.9\" (1.623 m).    Weight as of this encounter: 236 lb 12.8 oz (107.4 kg).  PHYSICAL EXAM:   General: no acute distress   Eyes: EOMI; sclera normal; conjunctiva normal   ENT:sinuses non-tender  without erythema or exudate  Neck: trachea midline;   Respiratory: no increased work of breathing; good air exchange; CTAB; no crackles or wheezing    Cardiovascular: RRR; S1, S2; no murmurs;  Gastrointestinal: normal bowel sounds; soft; non-distended; mild epigastric tenderness with palpation  Neurological: awake, alert, oriented x3; CNII-XII grossly intact  Behavioral/Psych: euthymic; appropriate affect   Skin: no facial rashes     Assessment & Plan:     Rubi was seen today for other.    Diagnoses and all orders for this visit:    Atypical chest pain-most likely related to reflux vs cardiac vs other   -     XR CHEST PA + LAT CHEST (CPT=71046); Future  -     EKG with interpretation and Report -IN OFFICE [78090]- showed NSR without evidence of acute ischemia  -     CARD NUC STRESS TEST LEXISCAN (CPT 11573/59801/); Future    Reflux gastritis  -  start   pantoprazole 40 MG Oral Tab EC; Take 1 tablet (40 mg total) by mouth every morning before breakfast.  -avoid triggers    MORALES (dyspnea on exertion)  -     CARD NUC STRESS TEST LEXISCAN (CPT 06884/20228/); Future  -EKG    Type 2 diabetes mellitus with hyperglycemia, without long-term current use of insulin (HCC)  -continue metformin   -repeat A1c 9/2024    Post nasal drip- trial of flonase      Amber Stroud MD

## 2024-07-18 ENCOUNTER — OFFICE VISIT (OUTPATIENT)
Facility: LOCATION | Age: 64
End: 2024-07-18
Payer: COMMERCIAL

## 2024-07-18 ENCOUNTER — HOSPITAL ENCOUNTER (OUTPATIENT)
Dept: GENERAL RADIOLOGY | Age: 64
Discharge: HOME | End: 2024-07-18
Attending: INTERNAL MEDICINE
Payer: COMMERCIAL

## 2024-07-18 DIAGNOSIS — H90.3 ASYMMETRIC SNHL (SENSORINEURAL HEARING LOSS): Primary | ICD-10-CM

## 2024-07-18 DIAGNOSIS — H90.71 MIXED CONDUCTIVE AND SENSORINEURAL HEARING LOSS OF RIGHT EAR WITH UNRESTRICTED HEARING OF LEFT EAR: Primary | ICD-10-CM

## 2024-07-18 DIAGNOSIS — H93.11 RIGHT-SIDED TINNITUS: ICD-10-CM

## 2024-07-18 DIAGNOSIS — R07.89 ATYPICAL CHEST PAIN: ICD-10-CM

## 2024-07-18 PROCEDURE — 71046 X-RAY EXAM CHEST 2 VIEWS: CPT | Performed by: INTERNAL MEDICINE

## 2024-07-18 PROCEDURE — 99203 OFFICE O/P NEW LOW 30 MIN: CPT | Performed by: OTOLARYNGOLOGY

## 2024-07-18 PROCEDURE — 92557 COMPREHENSIVE HEARING TEST: CPT | Performed by: AUDIOLOGIST

## 2024-07-18 PROCEDURE — 92567 TYMPANOMETRY: CPT | Performed by: AUDIOLOGIST

## 2024-07-18 NOTE — PROGRESS NOTES
Rubi Lee was seen for an audiometric evaluation and tympanogram today. Referred back to physician.    Yessy Estrada, AuD

## 2024-07-18 NOTE — PROGRESS NOTES
Rubi Lee is a 63 year old female.   Chief Complaint   Patient presents with    Hearing Check     HPI:   History is difficult due to language barrier.  She has a history of hearing loss in the right ear.  She has no history of ear pain.  Current Outpatient Medications   Medication Sig Dispense Refill    pantoprazole 40 MG Oral Tab EC Take 1 tablet (40 mg total) by mouth every morning before breakfast. 90 tablet 0    fluticasone propionate 50 MCG/ACT Nasal Suspension 2 sprays by Each Nare route daily as needed for Rhinitis. 18.2 mL 0    atorvastatin 20 MG Oral Tab Take 0.5 tablets (10 mg total) by mouth nightly.      metFORMIN 500 MG Oral Tab Take 1 tablet (500 mg total) by mouth daily.        Past Medical History:    Diabetes (HCC)    Esophageal reflux    Hyperlipidemia      Social History:  Social History     Socioeconomic History    Marital status:    Tobacco Use    Smoking status: Never    Smokeless tobacco: Never   Vaping Use    Vaping status: Never Used   Substance and Sexual Activity    Alcohol use: Never    Drug use: Never   Other Topics Concern    Caffeine Concern No    Exercise Yes    Seat Belt No    Special Diet No    Stress Concern Yes    Weight Concern No     Social Determinants of Health     Financial Resource Strain: Not on File (10/7/2022)    Received from [employer]    Financial Resource Strain     Financial Resource Strain: 0   Food Insecurity: Declined (11/22/2023)    Received from [employer]    Food Insecurity     Food: 99   Transportation Needs: Not on File (10/7/2022)    Received from [employer]    Transportation Needs     Transportation: 0   Physical Activity: Not on File (10/7/2022)    Received from [employer]    Physical Activity     Physical Activity: 0   Stress: Not on File (10/7/2022)    Received from [employer]    Stress     Stress: 0   Social Connections: Not on File (10/7/2022)    Received from [employer]    Social Connections     Social Connections and Isolation: 0   Housing Stability: Declined  (2023)    Received from [employer]     Housin      Past Surgical History:   Procedure Laterality Date    Cholecystectomy      Thyroidectomy      partial per pt- reports done ~. denies cancer hx         REVIEW OF SYSTEMS:   GENERAL HEALTH: feels well otherwise  GENERAL : denies fever, chills, sweats, weight loss, weight gain  SKIN: denies any unusual skin lesions or rashes  RESPIRATORY: denies shortness of breath with exertion  NEURO: denies headaches    EXAM:   There were no vitals taken for this visit.    System Findings Details   Constitutional  Overall appearance - Normal.   Psychiatric  Orientation - Oriented to time, place, person & situation. Appropriate mood and affect.   Head/Face  Facial features -- Normal. Skull - Normal.   Eyes  Pupils equal ,round ,react to light and accomidate   Ears, Nose, Throat, Neck  Ears are clear no fluid oropharynx clear neck no masses   Neurological  Memory - Normal. Cranial nerves - Cranial nerves II through XII grossly intact.   Lymph Detail  Submental. Submandibular. Anterior cervical. Posterior cervical. Supraclavicular.     Latest Audiogram Result (Hz) Exam performed: 2024 3:42 PM Last edited by Yessy Estrada Au.D on 2024 4:07 PM        125 250  1500 2000 3000 4000 6000 8000    Left air:  15 15  10  10  5  15    Right air (masked):  105 95  110  120  115N  95N    Left mastoid bone:   15  15  10  15      Right mastoid bone (masked):   70N  80  85  85         Reliability:  Good    Transducer:  Inserts    Technique:  Conventional Audiometry    Comments:            Latest Speech Audiometry  Last edited by Yessy Estrada Au.D on 2024 4:04 PM       Ear Method PTA SAT SRT [facility] Test/list Score (%) Intensity Mask/noise Notes    right               left    10    92 50       Comments: DNT right speech due to the magnitude of the hearing loss                  Latest Tympanogram Result       Probe Tone (Hz): Unknown  Exam performed: 7/18/2024 3:42 PM Last edited by Yessy Estrada Au.D on 7/18/2024 4:07 PM      Tympanograms  These were drawn by a user, not generated from device data      Right Ear Left Ear                     Right Ear Left Ear    Tympanogram type: Type A Type A    Canal volume (mL): 1.09 0.73    Peak pressure (daPa): 10 -12    Peak amplitude (mL): 0.49 0.56    Tympanogram width (daPa):        Comments:                    Latest Audiogram and Tympanogram Result Text  Last edited by Yessy Estrada Au.D on 7/18/2024  4:07 PM      Study Result                 Narrative & Impression  Patient complained of right hearing loss & right tinnitus.    Audiogram: Right profound mixed hearing loss 250-8000 Hz. Left wnl 250-8000 Hz.    Word Recognition Score in Quiet: DNT right speech due to the magnitude of the hearing loss. Left excellent.    Tympanometry: WNL, bilaterally.    Recommend: Follow up with ENT.  Quentin Monge                     ASSESSMENT AND PLAN:   1. Asymmetric SNHL (sensorineural hearing loss)  Betty shows profound hearing loss in the right ear.  She states it started during the pandemic.  She needs an MRI scan to rule out anything significant.  After MRI scan I would then consider a BiCROS hearing aid.  - MRI IACS (W+WO) (CPT=70553); Future      The patient indicates understanding of these issues and agrees to the plan.    No follow-ups on file.    Benjamin Meredith MD  7/18/2024  5:52 PM

## 2024-07-25 ENCOUNTER — HOSPITAL ENCOUNTER (OUTPATIENT)
Dept: CV DIAGNOSTICS | Facility: HOSPITAL | Age: 64
Discharge: HOME | End: 2024-07-25
Attending: INTERNAL MEDICINE
Payer: COMMERCIAL

## 2024-07-25 ENCOUNTER — HOSPITAL ENCOUNTER (OUTPATIENT)
Dept: BONE DENSITY | Age: 64
Discharge: HOME | End: 2024-07-25
Attending: INTERNAL MEDICINE
Payer: COMMERCIAL

## 2024-07-25 ENCOUNTER — HOSPITAL ENCOUNTER (OUTPATIENT)
Dept: MAMMOGRAPHY | Age: 64
Discharge: HOME | End: 2024-07-25
Attending: INTERNAL MEDICINE
Payer: COMMERCIAL

## 2024-07-25 DIAGNOSIS — E28.319 EARLY MENOPAUSE: ICD-10-CM

## 2024-07-25 DIAGNOSIS — R06.09 DOE (DYSPNEA ON EXERTION): ICD-10-CM

## 2024-07-25 DIAGNOSIS — Z01.89 ROUTINE LAB DRAW: ICD-10-CM

## 2024-07-25 DIAGNOSIS — R07.89 ATYPICAL CHEST PAIN: ICD-10-CM

## 2024-07-25 DIAGNOSIS — Z12.31 ENCOUNTER FOR SCREENING MAMMOGRAM FOR MALIGNANT NEOPLASM OF BREAST: ICD-10-CM

## 2024-07-25 PROCEDURE — 77063 BREAST TOMOSYNTHESIS BI: CPT | Performed by: INTERNAL MEDICINE

## 2024-07-25 PROCEDURE — 77080 DXA BONE DENSITY AXIAL: CPT | Performed by: INTERNAL MEDICINE

## 2024-07-25 PROCEDURE — 78452 HT MUSCLE IMAGE SPECT MULT: CPT | Performed by: INTERNAL MEDICINE

## 2024-07-25 PROCEDURE — 93017 CV STRESS TEST TRACING ONLY: CPT | Performed by: INTERNAL MEDICINE

## 2024-07-25 PROCEDURE — 77067 SCR MAMMO BI INCL CAD: CPT | Performed by: INTERNAL MEDICINE

## 2024-07-25 RX ORDER — REGADENOSON 0.08 MG/ML
INJECTION, SOLUTION INTRAVENOUS
Status: COMPLETED
Start: 2024-07-25 | End: 2024-07-25

## 2024-07-25 RX ADMIN — REGADENOSON 0.4 MG: 0.08 INJECTION, SOLUTION INTRAVENOUS at 14:00:00

## 2024-07-31 ENCOUNTER — TELEPHONE (OUTPATIENT)
Facility: CLINIC | Age: 64
End: 2024-07-31

## 2024-07-31 ENCOUNTER — OFFICE VISIT (OUTPATIENT)
Facility: CLINIC | Age: 64
End: 2024-07-31
Payer: COMMERCIAL

## 2024-07-31 VITALS
SYSTOLIC BLOOD PRESSURE: 124 MMHG | HEIGHT: 63.9 IN | HEART RATE: 86 BPM | BODY MASS INDEX: 41.66 KG/M2 | DIASTOLIC BLOOD PRESSURE: 80 MMHG | WEIGHT: 241 LBS

## 2024-07-31 DIAGNOSIS — N90.7 CYST OF VULVA: ICD-10-CM

## 2024-07-31 DIAGNOSIS — Z12.4 SCREENING FOR CERVICAL CANCER: ICD-10-CM

## 2024-07-31 DIAGNOSIS — Z80.49 FH: UTERINE CANCER: ICD-10-CM

## 2024-07-31 DIAGNOSIS — N93.9 ABNORMAL UTERINE BLEEDING (AUB): ICD-10-CM

## 2024-07-31 DIAGNOSIS — Z01.419 WELL WOMAN EXAM WITH ROUTINE GYNECOLOGICAL EXAM: Primary | ICD-10-CM

## 2024-07-31 DIAGNOSIS — N95.0 POSTMENOPAUSAL BLEEDING: ICD-10-CM

## 2024-07-31 DIAGNOSIS — N76.0 VAGINITIS AND VULVOVAGINITIS: ICD-10-CM

## 2024-07-31 DIAGNOSIS — N90.7 INCLUSION CYST OF VULVA: ICD-10-CM

## 2024-07-31 DIAGNOSIS — N84.1 CERVICAL POLYP: ICD-10-CM

## 2024-07-31 DIAGNOSIS — N95.1 PERIMENOPAUSAL VASOMOTOR SYMPTOMS: ICD-10-CM

## 2024-07-31 DIAGNOSIS — N95.1 VASOMOTOR SYMPTOMS DUE TO MENOPAUSE: ICD-10-CM

## 2024-07-31 DIAGNOSIS — N76.0 VAGINITIS AND VULVOVAGINITIS, UNSPECIFIED: Primary | ICD-10-CM

## 2024-07-31 DIAGNOSIS — Z80.49 FAMILY HISTORY OF UTERINE CANCER: ICD-10-CM

## 2024-07-31 PROCEDURE — 99459 PELVIC EXAMINATION: CPT | Performed by: STUDENT IN AN ORGANIZED HEALTH CARE EDUCATION/TRAINING PROGRAM

## 2024-07-31 PROCEDURE — 81514 NFCT DS BV&VAGINITIS DNA ALG: CPT | Performed by: STUDENT IN AN ORGANIZED HEALTH CARE EDUCATION/TRAINING PROGRAM

## 2024-07-31 PROCEDURE — 87624 HPV HI-RISK TYP POOLED RSLT: CPT | Performed by: STUDENT IN AN ORGANIZED HEALTH CARE EDUCATION/TRAINING PROGRAM

## 2024-07-31 PROCEDURE — 88175 CYTOPATH C/V AUTO FLUID REDO: CPT | Performed by: STUDENT IN AN ORGANIZED HEALTH CARE EDUCATION/TRAINING PROGRAM

## 2024-07-31 PROCEDURE — 99204 OFFICE O/P NEW MOD 45 MIN: CPT | Performed by: STUDENT IN AN ORGANIZED HEALTH CARE EDUCATION/TRAINING PROGRAM

## 2024-07-31 PROCEDURE — 99396 PREV VISIT EST AGE 40-64: CPT | Performed by: STUDENT IN AN ORGANIZED HEALTH CARE EDUCATION/TRAINING PROGRAM

## 2024-07-31 NOTE — TELEPHONE ENCOUNTER
----- Message from Cookie HINES sent at 7/31/2024  2:08 PM CDT -----  Regarding: FW: please schedule for hysteroscopy, polypectomy with true clear    ----- Message -----  From: Noreen Henao MD  Sent: 7/31/2024   2:03 PM CDT  To: [facility]  Subject: please schedule for hysteroscopy, polypectom#    please schedule for hysteroscopy, polypectomy with true clear

## 2024-07-31 NOTE — H&P (VIEW-ONLY)
[facility]  Obstetrics and Gynecology   History & Physical      Chief complaint:   Chief Complaint   Patient presents with    New Patient     Establish care     Gyn Problem    Vaginal Problem     Vulvar abscess? Left side of the labia, not really painful but it's annoying     Wellness Visit     Needs pap smear today, last pap smear was \"a long time ago\", her PCP told her she doesn't need pap smears anymore, no hx of hysterectomy     Vaginal Bleeding     No periods for a while and noticed last week some brownish discharge with some red spotting        Subjective:     HPI: Rubi Lee is a 63 year old  presenting for [employer].    Uses the following pronouns: she/her.    Her PCP is Amber Stroud MD.    #Vulvar mass  - first time she noticed it was 2 months ago - she was able to express fluid from it.  Didn't have fevers or chills.   - last month she wasn't able to express anything from it  - has noticed bumps like this before in her groin area  - it is painful and it burns  - Previous gynecologist moved to [place] - last pap 8 years   - cleans herself multiple times per day and is wondering if this is causing dryness    Rae/Post-menopausal:  How many years in menopause?: stopped periods at 36 yo - was told she entered menopause     Vasomotor screen  Hot flushes: still gets hot flushes.  3-4x a day has to take a bath.  6x per day.  Takes some natural supplement    Sleep:   - hours per night: 8 hours  - difficulty falling asleep: no  - difficulty staying asleep:  yes - gets hot flushes 2-3x per night   Mood:   - emotional lability? Gets angry really fast   - depression symptoms: Situational.  Due to issues with her family...  doesn't think it is depression.    Interested HRT?   - History of HRT: never  - Candidate for HRT? (Contraindications - liver dz, h/o breast ca, h/o endometrial hyperplasia, porphyria, untreated HTN): no  - History of hysterectomy? no    GSM + urinary screen  Vulvovaginal:   -  dryness yes from wiping a lot   - burning no   - pruritus no   - discharge for 2 weeks, has a brown discharge from vagina but not bleeding   - bleeding/spotting : yes a little bit of spotting   Urine: no issue    Other Pelvic pain screen: denies pelvic pain     Prolapse screen  Vaginal bulge/pressure: no   GI symptoms:  - constipation: yes, no blood - last colonoscopy - many years ago.  She is going to make an appt.    Sexual health:  - Sexually active? No   - Sexual satisfaction - doesn't want to have sex   - STD history: no  - Interested in STD testing today?: no     Gynecologic Hygiene:  - Vulvar: Water, soap   - Douche?: no  - Cotton underwear: yes    Cancer Screening:  Family history of ovarian/endometrial/cervical/breast cancer? :   - mother  of uterine cancer at 63 yo  Cervical CA:   - last pap/HPV 8 years ago?   - due for one today? : yes  - History of abnl pap/HPV: no  Breast cancer:   - any breast issues today?: no        ROS negative unless otherwise stated above    OB History  OB History    Para Term  AB Living   4 4 0 0 0 4   SAB IAB Ectopic Multiple Live Births   0 0 0 0 4     OB History    Para Term  AB Living   4 4       4   SAB IAB Ectopic Multiple Live Births           4      # Outcome Date GA Lbr Breezy/2nd Weight Sex Type Anes PTL Lv   4 Para      NORMAL SPONT   NAIDA   3 Para      NORMAL SPONT   NAIDA   2 Para      NORMAL SPONT   NAIDA   1 Para      NORMAL SPONT   NAIDA       PMH:  Past Medical History:    Diabetes (HCC)    Esophageal reflux    Hyperlipidemia       PSH:  Past Surgical History:   Procedure Laterality Date    Cholecystectomy      Thyroidectomy      partial per pt- reports done ~. denies cancer hx       Meds:  Current Outpatient Medications on File Prior to Visit   Medication Sig Dispense Refill    pantoprazole 40 MG Oral Tab EC Take 1 tablet (40 mg total) by mouth every morning before breakfast. 90 tablet 0    fluticasone propionate 50 MCG/ACT Nasal  Suspension 2 sprays by Each Nare route daily as needed for Rhinitis. 18.2 mL 0    metFORMIN 500 MG Oral Tab Take 1 tablet (500 mg total) by mouth daily.      atorvastatin 20 MG Oral Tab Take 0.5 tablets (10 mg total) by mouth nightly. (Patient not taking: Reported on 2024)       No current facility-administered medications on file prior to visit.       All:  No Known Allergies    Social History:  Social History     Socioeconomic History    Marital status:    Tobacco Use    Smoking status: Never    Smokeless tobacco: Never   Vaping Use    Vaping status: Never Used   Substance and Sexual Activity    Alcohol use: Never    Drug use: Never   Other Topics Concern    Caffeine Concern No    Exercise Yes    Seat Belt No    Special Diet No    Stress Concern Yes    Weight Concern No     Social Determinants of Health     Financial Resource Strain: Not on File (10/7/2022)    Received from [employer]    Financial Resource Strain     Financial Resource Strain: 0   Food Insecurity: Declined (2023)    Received from [employer]    Food Insecurity     Food: 99   Transportation Needs: Not on File (10/7/2022)    Received from [employer]    Transportation Needs     Transportation: 0   Physical Activity: Not on File (10/7/2022)    Received from [employer]    Physical Activity     Physical Activity: 0   Stress: Not on File (10/7/2022)    Received from [employer]    Stress     Stress: 0   Social Connections: Not on File (10/7/2022)    Received from [employer]    Social Connections     Social Connections and Isolation: 0   Housing Stability: Declined (2023)    Received from [employer]    Housing Stability     [employer]        Family History:  Family History   Problem Relation Age of Onset    Cancer Mother     Stroke Paternal Grandfather     Heart Disease Neg        Immunization History:  Immunization History   Administered Date(s) Administered    Covid-19 Vaccine Moderna 100 mcg/0.5 ml 2021, 2021, 2021    FLUZONE 6 months and older  PFS 0.5 ml (18440) 10/31/2018, 09/14/2020, 11/21/2021, 09/12/2022, 10/11/2023    Pneumococcal Conjugate PCV20 06/08/2024    TDAP 06/08/2024    Zoster Vaccine Recombinant Adjuvanted (Shingrix) 06/07/2022, 09/12/2022         Objective:     Vitals:    07/31/24 1303   BP: 124/80   Pulse: 86   Weight: 241 lb (109.3 kg)   Height: 63.9\"       Body mass index is 41.5 kg/m².    Physical Exam:     General: normal appearance  HEENT: normocephalic  Breast: normal contour  Respiratory: normal work of breathing, no extra use of accessory muscles  Cardiac: normal rate  Abdominal: Nontender  MSK: normal range of motion  Neuro: normal movement, normal sensory  Skin: no abnormalities seen    Pelvic Exam:  Speculum  - normal appearing perineum, anus  - bilateral labia majora are with multiple inclusion cysts - no infection seen, not expressable  - normal appearing urethral meatus, urethra  - normal appearing vagina, well estrogenized with ruggae, physiologic discharge  - multiparous cervix with polyp  Swabs:   - pap + HPV  - vaginitis panel      Labs:  Lab Results   Component Value Date    WBC 14.9 (H) 06/10/2024    RBC 4.87 06/10/2024    HGB 15.1 06/10/2024    HCT 43.9 06/10/2024    MCV 90.1 06/10/2024    MCH 31.0 06/10/2024    MCHC 34.4 06/10/2024    RDW 13.1 06/10/2024    .0 06/10/2024        Lab Results   Component Value Date     (H) 06/10/2024    BUN 12 06/10/2024    BUNCREA 15.4 06/10/2024    CREATSERUM 0.78 06/10/2024    ANIONGAP 8 06/10/2024    CA 9.2 06/10/2024    OSMOCALC 296 (H) 06/10/2024    ALKPHO 106 06/10/2024    AST 24 06/10/2024    ALT 19 06/10/2024    BILT 0.4 06/10/2024    TP 7.9 06/10/2024    ALB 4.6 06/10/2024    GLOBULIN 3.3 06/10/2024     06/10/2024    K 4.3 06/10/2024     06/10/2024    CO2 25.0 06/10/2024       Lab Results   Component Value Date    CHOLEST 142 06/10/2024    TRIG 123 06/10/2024    HDL 43 06/10/2024    LDL 77 06/10/2024    VLDL 19 06/10/2024    NONHDLC 99 06/10/2024         Lab Results   Component Value Date    T4F 1.0 06/10/2024    TSH 2.579 06/10/2024        Lab Results   Component Value Date     (H) 06/10/2024    A1C 6.9 (H) 06/10/2024       Imaging:  [facility] BEBA 2D+3D SCREENING BILAT (CPT=77067/03607)    Result Date: 7/25/2024  CONCLUSION:  Stable mammogram  BI-RADS CATEGORY:  DIAGNOSTIC CATEGORY 2--BENIGN FINDING:   RECOMMENDATIONS:  ROUTINE MAMMOGRAM AND CLINICAL EVALUATION IN 12 MONTHS.       A letter explaining the results in lay terms has been sent to the patient.  This exam was evaluated with a computer-aided device.  This patient's information has been entered into a reminder system with a target due date for the next mammogram.   LOCATION:  [facility]   Dictated by (CST): Meet Strong MD on 7/25/2024 at 1:45 PM     Finalized by (CST): Meet Strong MD on 7/25/2024 at 1:50 PM       XR DEXA BONE DENSITOMETRY (CPT=77080)    Result Date: 7/25/2024  CONCLUSION:  Bone mineral density values are within normal range when compared to normal patient population.  Recommendation:  The National Osteoporosis Foundation (NOF) recommends pharmacological treatment for patients with a FRAX 10-year risk score of 3% or higher for a hip fracture or 20% or higher for a major osteoporotic fracture, to prevent osteoporosis and reduce fracture risk.  The World Health Organization has defined the following categories based on bone density: Normal bone:  T-score greater than or equal to -1 Osteopenia: T-score  less than -1 and greater  than -2.5 Osteoporosis:  T-score less than or equal -2.5   LOCATION:  [facility]     Dictated by (CST): John Patino MD on 7/25/2024 at 12:34 PM     Finalized by (CST): John Patino MD on 7/25/2024 at 12:35 PM       XR CHEST PA + LAT CHEST (CPT=71046)    Result Date: 7/18/2024  CONCLUSION:   Stable cardiac and mediastinal contours.  The lungs and pleural spaces are clear.  Partially visualized left reverse total shoulder prosthesis.  No acute osseous findings.    LOCATION:  [facility]   Dictated by (CST): Kimberly Montgomery MD on 2024 at 10:22 AM     Finalized by (CST): Kimberly Montgomery MD on 2024 at 10:22 AM       XR KNEE, COMPLETE (4 OR MORE VIEWS), RIGHT (CPT=73564)    Result Date: 7/3/2024  CONCLUSION:  No acute fractures.  Osteoarthritic changes.   LOCATION:  [facility]   Dictated by (CST): Taisha Patino MD on 2024 at 12:21 PM     Finalized by (CST): Taisha Patino MD on 2024 at 12:23 PM       XR KNEE, COMPLETE (4 OR MORE VIEWS), LEFT (CPT=73564)    Result Date: 7/3/2024  CONCLUSION:  No acute fractures.  Osteoarthritic changes.   LOCATION:  [facility]   Dictated by (CST): Taisha Patino MD on 2024 at 12:12 PM     Finalized by (CST): Taisha Patino MD on 2024 at 12:21 PM         Assessment:     Rubi Lee is a 63 year old  presenting for [employer].   used.    #WWE  [x] well women labs done w PCP    #Vulvar inclusion cysts  - benign finding, do not recommend surgery    #cervical polyp  #post menopausal bleed  #family history of endometrial cancer in mother  [ ] schedule hysteroscopy, polypectomy with true clear   Low risks include:  - uterine perforation  - infection  - blood loss  - damaging adjacent organ structures including the bowel, bladder  - needing additional procedures    Patient agrees to above surgery    #Vasomotor symptoms of menopause  Candidate for HRT?: no  For next visit continue conversation on alternative to HRT:  - SSRIs: paroxetine, citalopram, escitalopram (fluoxetine, sertraline don't work well)  - SNRIs: venlafaxine  - Clonidine:  .1 mg daily  - Gabapentin: 300 mg at night  - Fezolinetant (Veozah): NK3R agonist, 45 mg nightly - CI - known cirrhosis.  Must measure LFTs q3 months the first 9 months.  But $$$.  - Conservative: stress mgt, CBT, relaxation, deep breathing, yoga, hypnosis, acupuncture  - Stellate ganglion blockade (neck block)  Not recommended:  - Herbal remedies like black cohash (can cause breast  CA)  - Phytoestrogens (plants based estrogen)     #Vulvar itching  Leading dx: vaginitis  Symptoms:  [ ] f/u vaginitis panel    #Vaginal hygiene  - clean with warm water (no soap)  - pat dry  - no douching  - avoid shaving (clipping/cutting ok).  If unable to avoid, consider using laser hair removal  - cotton underwear  - avoid fragrant detergents    #STD screen  declined    #Cervical cancer screening  [ ] f/u Pap and HPV    #Breast cancer screening  Annual mammogram - last this year    #Lifestyle recommendations from the American College of Lifestyle Medicine  1) Nutrition: extensive scientific evidence supports a diet that is predominantly whole-food and plant based as an important strategy in health optimization.  Such a diet is rich in fiber, antioxidants and is nutrient dense (ex. Minimally processed vegetables, fruits, whole grains, legumes, nuts and seeds)  2) Physical activity: at least 150 minutes of moderate exercise per week (anything that gets your heart beating faster).  You could divide this time into 30 minutes per day for 5 days.  2 of these days should be devoted to whole body muscle-strengthening/building activities (weight lifting, for example).  3) Sleep: 7 to 9 hours per night of restorative sleep.  You can use black out curtains, white noise machine and minimize screen time to do this.    4) Continue to avoid or limit risky substances like alcohol, nicotine, vaping, drugs, prescription opioids.  If you need help - through medication or counseling - to do this, please contact me so I can get you a referral or resources to support you.  5) Stress: Continue developing coping strategies to decrease stress.  6) Leverage the power of relationships and social networks to help reinforce health behaviors.  Studies show people are more successful at achieving health goals if the people they live with are supporting and even working towards those same health goals.    Return of care in 1 year or  MIKY Henao MD   [facility] - OBGYN    Note to patient and family:  The 21st Century Cures Act makes medical notes available to patients in the interest of transparency.  However, please be advised that this is a medical document.  It is intended as a peer to peer communication.  It is written in medical language and may contain abbreviations or verbiage that are technical and unfamiliar.  It may appear blunt or direct.  Medical documents are intended to carry relevant information, facts as evident, and the clinical opinion of the practitioner.

## 2024-07-31 NOTE — H&P
[facility]  Obstetrics and Gynecology   History & Physical      Chief complaint:   Chief Complaint   Patient presents with    New Patient     Establish care     Gyn Problem    Vaginal Problem     Vulvar abscess? Left side of the labia, not really painful but it's annoying     Wellness Visit     Needs pap smear today, last pap smear was \"a long time ago\", her PCP told her she doesn't need pap smears anymore, no hx of hysterectomy     Vaginal Bleeding     No periods for a while and noticed last week some brownish discharge with some red spotting        Subjective:     HPI: Rubi Lee is a 63 year old  presenting for WWE.    Uses the following pronouns: she/her.    Her PCP is Amber Stroud MD.    #Vulvar mass  - first time she noticed it was 2 months ago - she was able to express fluid from it.  Didn't have fevers or chills.   - last month she wasn't able to express anything from it  - has noticed bumps like this before in her groin area  - it is painful and it burns  - Previous gynecologist moved to [place] - last pap 8 years   - cleans herself multiple times per day and is wondering if this is causing dryness    Rae/Post-menopausal:  How many years in menopause?: stopped periods at 38 yo - was told she entered menopause     Vasomotor screen  Hot flushes: still gets hot flushes.  3-4x a day has to take a bath.  6x per day.  Takes some natural supplement    Sleep:   - hours per night: 8 hours  - difficulty falling asleep: no  - difficulty staying asleep:  yes - gets hot flushes 2-3x per night   Mood:   - emotional lability? Gets angry really fast   - depression symptoms: Situational.  Due to issues with her family...  doesn't think it is depression.    Interested HRT?   - History of HRT: never  - Candidate for HRT? (Contraindications - liver dz, h/o breast ca, h/o endometrial hyperplasia, porphyria, untreated HTN): no  - History of hysterectomy? no    GSM + urinary screen  Vulvovaginal:   -  dryness yes from wiping a lot   - burning no   - pruritus no   - discharge for 2 weeks, has a brown discharge from vagina but not bleeding   - bleeding/spotting : yes a little bit of spotting   Urine: no issue    Other Pelvic pain screen: denies pelvic pain     Prolapse screen  Vaginal bulge/pressure: no   GI symptoms:  - constipation: yes, no blood - last colonoscopy - many years ago.  She is going to make an appt.    Sexual health:  - Sexually active? No   - Sexual satisfaction - doesn't want to have sex   - STD history: no  - Interested in STD testing today?: no     Gynecologic Hygiene:  - Vulvar: Water, soap   - Douche?: no  - Cotton underwear: yes    Cancer Screening:  Family history of ovarian/endometrial/cervical/breast cancer? :   - mother  of uterine cancer at 61 yo  Cervical CA:   - last pap/HPV 8 years ago?   - due for one today? : yes  - History of abnl pap/HPV: no  Breast cancer:   - any breast issues today?: no        ROS negative unless otherwise stated above    OB History  OB History    Para Term  AB Living   4 4 0 0 0 4   SAB IAB Ectopic Multiple Live Births   0 0 0 0 4     OB History    Para Term  AB Living   4 4       4   SAB IAB Ectopic Multiple Live Births           4      # Outcome Date GA Lbr Breezy/2nd Weight Sex Type Anes PTL Lv   4 Para      NORMAL SPONT   NAIDA   3 Para      NORMAL SPONT   NAIDA   2 Para      NORMAL SPONT   NAIDA   1 Para      NORMAL SPONT   NAIDA       PMH:  Past Medical History:    Diabetes (HCC)    Esophageal reflux    Hyperlipidemia       PSH:  Past Surgical History:   Procedure Laterality Date    Cholecystectomy      Thyroidectomy      partial per pt- reports done ~. denies cancer hx       Meds:  Current Outpatient Medications on File Prior to Visit   Medication Sig Dispense Refill    pantoprazole 40 MG Oral Tab EC Take 1 tablet (40 mg total) by mouth every morning before breakfast. 90 tablet 0    fluticasone propionate 50 MCG/ACT Nasal  Suspension 2 sprays by Each Nare route daily as needed for Rhinitis. 18.2 mL 0    metFORMIN 500 MG Oral Tab Take 1 tablet (500 mg total) by mouth daily.      atorvastatin 20 MG Oral Tab Take 0.5 tablets (10 mg total) by mouth nightly. (Patient not taking: Reported on 2024)       No current facility-administered medications on file prior to visit.       All:  No Known Allergies    Social History:  Social History     Socioeconomic History    Marital status:    Tobacco Use    Smoking status: Never    Smokeless tobacco: Never   Vaping Use    Vaping status: Never Used   Substance and Sexual Activity    Alcohol use: Never    Drug use: Never   Other Topics Concern    Caffeine Concern No    Exercise Yes    Seat Belt No    Special Diet No    Stress Concern Yes    Weight Concern No     Social Determinants of Health     Financial Resource Strain: Not on File (10/7/2022)    Received from [facility]    Financial Resource Strain     Financial Resource Strain: 0   Food Insecurity: Declined (2023)    Received from [facility]    Food Insecurity     Food: 99   Transportation Needs: Not on File (10/7/2022)    Received from [facility]    Transportation Needs     Transportation: 0   Physical Activity: Not on File (10/7/2022)    Received from [facility]    Physical Activity     Physical Activity: 0   Stress: Not on File (10/7/2022)    Received from [facility]    Stress     Stress: 0   Social Connections: Not on File (10/7/2022)    Received from [facility]    Social Connections     Social Connections and Isolation: 0   Housing Stability: Declined (2023)    Received from [facility]    Housing Stability     Housin        Family History:  Family History   Problem Relation Age of Onset    Cancer Mother     Stroke Paternal Grandfather     Heart Disease Neg        Immunization History:  Immunization History   Administered Date(s) Administered    Covid-19 Vaccine Moderna 100 mcg/0.5 ml 2021, 2021, 2021    FLUZONE 6 months and older  PFS 0.5 ml (55491) 10/31/2018, 09/14/2020, 11/21/2021, 09/12/2022, 10/11/2023    Pneumococcal Conjugate PCV20 06/08/2024    TDAP 06/08/2024    Zoster Vaccine Recombinant Adjuvanted (Shingrix) 06/07/2022, 09/12/2022         Objective:     Vitals:    07/31/24 1303   BP: 124/80   Pulse: 86   Weight: 241 lb (109.3 kg)   Height: 63.9\"       Body mass index is 41.5 kg/m².    Physical Exam:     General: normal appearance  HEENT: normocephalic  Breast: normal contour  Respiratory: normal work of breathing, no extra use of accessory muscles  Cardiac: normal rate  Abdominal: Nontender  MSK: normal range of motion  Neuro: normal movement, normal sensory  Skin: no abnormalities seen    Pelvic Exam:  Speculum  - normal appearing perineum, anus  - bilateral labia majora are with multiple inclusion cysts - no infection seen, not expressable  - normal appearing urethral meatus, urethra  - normal appearing vagina, well estrogenized with ruggae, physiologic discharge  - multiparous cervix with polyp  Swabs:   - pap + HPV  - vaginitis panel      Labs:  Lab Results   Component Value Date    WBC 14.9 (H) 06/10/2024    RBC 4.87 06/10/2024    HGB 15.1 06/10/2024    HCT 43.9 06/10/2024    MCV 90.1 06/10/2024    MCH 31.0 06/10/2024    MCHC 34.4 06/10/2024    RDW 13.1 06/10/2024    .0 06/10/2024        Lab Results   Component Value Date     (H) 06/10/2024    BUN 12 06/10/2024    BUNCREA 15.4 06/10/2024    CREATSERUM 0.78 06/10/2024    ANIONGAP 8 06/10/2024    CA 9.2 06/10/2024    OSMOCALC 296 (H) 06/10/2024    ALKPHO 106 06/10/2024    AST 24 06/10/2024    ALT 19 06/10/2024    BILT 0.4 06/10/2024    TP 7.9 06/10/2024    ALB 4.6 06/10/2024    GLOBULIN 3.3 06/10/2024     06/10/2024    K 4.3 06/10/2024     06/10/2024    CO2 25.0 06/10/2024       Lab Results   Component Value Date    CHOLEST 142 06/10/2024    TRIG 123 06/10/2024    HDL 43 06/10/2024    LDL 77 06/10/2024    VLDL 19 06/10/2024    NONHDLC 99 06/10/2024         Lab Results   Component Value Date    T4F 1.0 06/10/2024    TSH 2.579 06/10/2024        Lab Results   Component Value Date     (H) 06/10/2024    A1C 6.9 (H) 06/10/2024       Imaging:  [facility] BEBA 2D+3D SCREENING BILAT (CPT=77067/99904)    Result Date: 7/25/2024  CONCLUSION:  Stable mammogram  BI-RADS CATEGORY:  DIAGNOSTIC CATEGORY 2--BENIGN FINDING:   RECOMMENDATIONS:  ROUTINE MAMMOGRAM AND CLINICAL EVALUATION IN 12 MONTHS.       A letter explaining the results in lay terms has been sent to the patient.  This exam was evaluated with a computer-aided device.  This patient's information has been entered into a reminder system with a target due date for the next mammogram.   LOCATION:  [facility]   Dictated by (CST): Meet Strong MD on 7/25/2024 at 1:45 PM     Finalized by (CST): Meet Strong MD on 7/25/2024 at 1:50 PM       XR DEXA BONE DENSITOMETRY (CPT=77080)    Result Date: 7/25/2024  CONCLUSION:  Bone mineral density values are within normal range when compared to normal patient population.  Recommendation:  The National Osteoporosis Foundation (NOF) recommends pharmacological treatment for patients with a FRAX 10-year risk score of 3% or higher for a hip fracture or 20% or higher for a major osteoporotic fracture, to prevent osteoporosis and reduce fracture risk.  The World Health Organization has defined the following categories based on bone density: Normal bone:  T-score greater than or equal to -1 Osteopenia: T-score  less than -1 and greater  than -2.5 Osteoporosis:  T-score less than or equal -2.5   LOCATION:  [facility]     Dictated by (CST): John Patino MD on 7/25/2024 at 12:34 PM     Finalized by (CST): John Patino MD on 7/25/2024 at 12:35 PM       XR CHEST PA + LAT CHEST (CPT=71046)    Result Date: 7/18/2024  CONCLUSION:   Stable cardiac and mediastinal contours.  The lungs and pleural spaces are clear.  Partially visualized left reverse total shoulder prosthesis.  No acute osseous findings.    LOCATION:  [facility]   Dictated by (CST): Kimberly Montgomery MD on 2024 at 10:22 AM     Finalized by (CST): Kimberly Montgomery MD on 2024 at 10:22 AM       XR KNEE, COMPLETE (4 OR MORE VIEWS), RIGHT (CPT=73564)    Result Date: 7/3/2024  CONCLUSION:  No acute fractures.  Osteoarthritic changes.   LOCATION:  [facility]   Dictated by (CST): Taisha Patino MD on 2024 at 12:21 PM     Finalized by (CST): Taisha Patino MD on 2024 at 12:23 PM       XR KNEE, COMPLETE (4 OR MORE VIEWS), LEFT (CPT=73564)    Result Date: 7/3/2024  CONCLUSION:  No acute fractures.  Osteoarthritic changes.   LOCATION:  [facility]   Dictated by (CST): Taisha Patino MD on 2024 at 12:12 PM     Finalized by (CST): Taisha Patino MD on 2024 at 12:21 PM         Assessment:     Rubi Lee is a 63 year old  presenting for WWE.   used.    #WWE  [x] well women labs done w PCP    #Vulvar inclusion cysts  - benign finding, do not recommend surgery    #cervical polyp  #post menopausal bleed  #family history of endometrial cancer in mother  [ ] schedule hysteroscopy, polypectomy with true clear   Low risks include:  - uterine perforation  - infection  - blood loss  - damaging adjacent organ structures including the bowel, bladder  - needing additional procedures    Patient agrees to above surgery    #Vasomotor symptoms of menopause  Candidate for HRT?: no  For next visit continue conversation on alternative to HRT:  - SSRIs: paroxetine, citalopram, escitalopram (fluoxetine, sertraline don't work well)  - SNRIs: venlafaxine  - Clonidine:  .1 mg daily  - Gabapentin: 300 mg at night  - Fezolinetant (Veozah): NK3R agonist, 45 mg nightly - CI - known cirrhosis.  Must measure LFTs q3 months the first 9 months.  But $$$.  - Conservative: stress mgt, CBT, relaxation, deep breathing, yoga, hypnosis, acupuncture  - Stellate ganglion blockade (neck block)  Not recommended:  - Herbal remedies like black cohash (can cause breast  CA)  - Phytoestrogens (plants based estrogen)     #Vulvar itching  Leading dx: vaginitis  Symptoms:  [ ] f/u vaginitis panel    #Vaginal hygiene  - clean with warm water (no soap)  - pat dry  - no douching  - avoid shaving (clipping/cutting ok).  If unable to avoid, consider using laser hair removal  - cotton underwear  - avoid fragrant detergents    #STD screen  declined    #Cervical cancer screening  [ ] f/u Pap and HPV    #Breast cancer screening  Annual mammogram - last this year    #Lifestyle recommendations from the American College of Lifestyle Medicine  1) Nutrition: extensive scientific evidence supports a diet that is predominantly whole-food and plant based as an important strategy in health optimization.  Such a diet is rich in fiber, antioxidants and is nutrient dense (ex. Minimally processed vegetables, fruits, whole grains, legumes, nuts and seeds)  2) Physical activity: at least 150 minutes of moderate exercise per week (anything that gets your heart beating faster).  You could divide this time into 30 minutes per day for 5 days.  2 of these days should be devoted to whole body muscle-strengthening/building activities (weight lifting, for example).  3) Sleep: 7 to 9 hours per night of restorative sleep.  You can use black out curtains, white noise machine and minimize screen time to do this.    4) Continue to avoid or limit risky substances like alcohol, nicotine, vaping, drugs, prescription opioids.  If you need help - through medication or counseling - to do this, please contact me so I can get you a referral or resources to support you.  5) Stress: Continue developing coping strategies to decrease stress.  6) Leverage the power of relationships and social networks to help reinforce health behaviors.  Studies show people are more successful at achieving health goals if the people they live with are supporting and even working towards those same health goals.    Return of care in 1 year or  MIKY Henao MD   [facility] - OBGYN    Note to patient and family:  The 21st Century Cures Act makes medical notes available to patients in the interest of transparency.  However, please be advised that this is a medical document.  It is intended as a peer to peer communication.  It is written in medical language and may contain abbreviations or verbiage that are technical and unfamiliar.  It may appear blunt or direct.  Medical documents are intended to carry relevant information, facts as evident, and the clinical opinion of the practitioner.

## 2024-08-01 LAB
BV BACTERIA DNA VAG QL NAA+PROBE: NEGATIVE
C GLABRATA DNA VAG QL NAA+PROBE: NEGATIVE
C KRUSEI DNA VAG QL NAA+PROBE: NEGATIVE
CANDIDA DNA VAG QL NAA+PROBE: NEGATIVE
HPV E6+E7 MRNA CVX QL NAA+PROBE: NEGATIVE
T VAGINALIS DNA VAG QL NAA+PROBE: NEGATIVE

## 2024-08-05 LAB
.: NORMAL
.: NORMAL

## 2024-08-09 RX ORDER — HEPARIN SODIUM 5000 [USP'U]/ML
5000 INJECTION, SOLUTION INTRAVENOUS; SUBCUTANEOUS ONCE
Status: CANCELLED | OUTPATIENT
Start: 2024-08-09 | End: 2024-08-09

## 2024-08-13 ENCOUNTER — LABORATORY ENCOUNTER (OUTPATIENT)
Dept: LAB | Age: 64
End: 2024-08-13
Attending: STUDENT IN AN ORGANIZED HEALTH CARE EDUCATION/TRAINING PROGRAM
Payer: COMMERCIAL

## 2024-08-13 DIAGNOSIS — N93.9 ABNORMAL UTERINE BLEEDING: ICD-10-CM

## 2024-08-13 LAB
ERYTHROCYTE [DISTWIDTH] IN BLOOD BY AUTOMATED COUNT: 14.1 %
HCT VFR BLD AUTO: 43.8 %
HGB BLD-MCNC: 14.8 G/DL
MCH RBC QN AUTO: 30.7 PG (ref 26–34)
MCHC RBC AUTO-ENTMCNC: 33.8 G/DL (ref 31–37)
MCV RBC AUTO: 90.9 FL
PLATELET # BLD AUTO: 227 10(3)UL (ref 150–450)
RBC # BLD AUTO: 4.82 X10(6)UL
WBC # BLD AUTO: 9.6 X10(3) UL (ref 4–11)

## 2024-08-13 PROCEDURE — 85027 COMPLETE CBC AUTOMATED: CPT

## 2024-08-13 PROCEDURE — 36415 COLL VENOUS BLD VENIPUNCTURE: CPT

## 2024-08-23 ENCOUNTER — ANESTHESIA (OUTPATIENT)
Dept: SURGERY | Facility: HOSPITAL | Age: 64
End: 2024-08-23
Payer: COMMERCIAL

## 2024-08-23 ENCOUNTER — HOSPITAL ENCOUNTER (OUTPATIENT)
Facility: HOSPITAL | Age: 64
Setting detail: HOSPITAL OUTPATIENT SURGERY
Discharge: HOME | End: 2024-08-23
Attending: STUDENT IN AN ORGANIZED HEALTH CARE EDUCATION/TRAINING PROGRAM | Admitting: STUDENT IN AN ORGANIZED HEALTH CARE EDUCATION/TRAINING PROGRAM
Payer: COMMERCIAL

## 2024-08-23 ENCOUNTER — ANESTHESIA EVENT (OUTPATIENT)
Dept: SURGERY | Facility: HOSPITAL | Age: 64
End: 2024-08-23
Payer: COMMERCIAL

## 2024-08-23 VITALS
TEMPERATURE: 98 F | WEIGHT: 241.5 LBS | BODY MASS INDEX: 41.23 KG/M2 | SYSTOLIC BLOOD PRESSURE: 137 MMHG | OXYGEN SATURATION: 93 % | HEIGHT: 64 IN | DIASTOLIC BLOOD PRESSURE: 82 MMHG | RESPIRATION RATE: 18 BRPM | HEART RATE: 67 BPM

## 2024-08-23 DIAGNOSIS — N93.9 ABNORMAL UTERINE BLEEDING (AUB): ICD-10-CM

## 2024-08-23 DIAGNOSIS — N90.7 INCLUSION CYST OF VULVA: ICD-10-CM

## 2024-08-23 DIAGNOSIS — N95.1 PERIMENOPAUSAL VASOMOTOR SYMPTOMS: ICD-10-CM

## 2024-08-23 DIAGNOSIS — N95.0 POSTMENOPAUSAL BLEEDING: ICD-10-CM

## 2024-08-23 DIAGNOSIS — N76.0 VAGINITIS AND VULVOVAGINITIS, UNSPECIFIED: ICD-10-CM

## 2024-08-23 DIAGNOSIS — N93.9 ABNORMAL UTERINE BLEEDING: Primary | ICD-10-CM

## 2024-08-23 DIAGNOSIS — N84.1 CERVICAL POLYP: ICD-10-CM

## 2024-08-23 DIAGNOSIS — Z80.49 FH: UTERINE CANCER: ICD-10-CM

## 2024-08-23 LAB
GLUCOSE BLD-MCNC: 125 MG/DL (ref 70–99)
GLUCOSE BLD-MCNC: 149 MG/DL (ref 70–99)

## 2024-08-23 PROCEDURE — 0UB98ZX EXCISION OF UTERUS, VIA NATURAL OR ARTIFICIAL OPENING ENDOSCOPIC, DIAGNOSTIC: ICD-10-PCS | Performed by: STUDENT IN AN ORGANIZED HEALTH CARE EDUCATION/TRAINING PROGRAM

## 2024-08-23 PROCEDURE — 58558 HYSTEROSCOPY BIOPSY: CPT | Performed by: STUDENT IN AN ORGANIZED HEALTH CARE EDUCATION/TRAINING PROGRAM

## 2024-08-23 RX ORDER — ONDANSETRON 2 MG/ML
4 INJECTION INTRAMUSCULAR; INTRAVENOUS EVERY 6 HOURS PRN
Status: DISCONTINUED | OUTPATIENT
Start: 2024-08-23 | End: 2024-08-23

## 2024-08-23 RX ORDER — DEXAMETHASONE SODIUM PHOSPHATE 4 MG/ML
VIAL (ML) INJECTION AS NEEDED
Status: DISCONTINUED | OUTPATIENT
Start: 2024-08-23 | End: 2024-08-23 | Stop reason: SURG

## 2024-08-23 RX ORDER — ACETAMINOPHEN 500 MG
1000 TABLET ORAL ONCE
Status: DISCONTINUED | OUTPATIENT
Start: 2024-08-23 | End: 2024-08-23 | Stop reason: HOSPADM

## 2024-08-23 RX ORDER — LIDOCAINE HYDROCHLORIDE 10 MG/ML
INJECTION, SOLUTION EPIDURAL; INFILTRATION; INTRACAUDAL; PERINEURAL AS NEEDED
Status: DISCONTINUED | OUTPATIENT
Start: 2024-08-23 | End: 2024-08-23 | Stop reason: SURG

## 2024-08-23 RX ORDER — INSULIN ASPART 100 [IU]/ML
INJECTION, SOLUTION INTRAVENOUS; SUBCUTANEOUS ONCE
Status: DISCONTINUED | OUTPATIENT
Start: 2024-08-23 | End: 2024-08-23

## 2024-08-23 RX ORDER — NICOTINE POLACRILEX 4 MG
30 LOZENGE BUCCAL
Status: DISCONTINUED | OUTPATIENT
Start: 2024-08-23 | End: 2024-08-23 | Stop reason: HOSPADM

## 2024-08-23 RX ORDER — SODIUM CHLORIDE, SODIUM LACTATE, POTASSIUM CHLORIDE, CALCIUM CHLORIDE 600; 310; 30; 20 MG/100ML; MG/100ML; MG/100ML; MG/100ML
INJECTION, SOLUTION INTRAVENOUS CONTINUOUS
Status: DISCONTINUED | OUTPATIENT
Start: 2024-08-23 | End: 2024-08-23

## 2024-08-23 RX ORDER — ACETAMINOPHEN 500 MG
1000 TABLET ORAL ONCE AS NEEDED
Status: DISCONTINUED | OUTPATIENT
Start: 2024-08-23 | End: 2024-08-23

## 2024-08-23 RX ORDER — NALOXONE HYDROCHLORIDE 0.4 MG/ML
80 INJECTION, SOLUTION INTRAMUSCULAR; INTRAVENOUS; SUBCUTANEOUS AS NEEDED
Status: DISCONTINUED | OUTPATIENT
Start: 2024-08-23 | End: 2024-08-23

## 2024-08-23 RX ORDER — KETOROLAC TROMETHAMINE 30 MG/ML
INJECTION, SOLUTION INTRAMUSCULAR; INTRAVENOUS AS NEEDED
Status: DISCONTINUED | OUTPATIENT
Start: 2024-08-23 | End: 2024-08-23 | Stop reason: SURG

## 2024-08-23 RX ORDER — LABETALOL HYDROCHLORIDE 5 MG/ML
5 INJECTION, SOLUTION INTRAVENOUS EVERY 5 MIN PRN
Status: DISCONTINUED | OUTPATIENT
Start: 2024-08-23 | End: 2024-08-23

## 2024-08-23 RX ORDER — PROCHLORPERAZINE EDISYLATE 5 MG/ML
5 INJECTION INTRAMUSCULAR; INTRAVENOUS EVERY 8 HOURS PRN
Status: DISCONTINUED | OUTPATIENT
Start: 2024-08-23 | End: 2024-08-23

## 2024-08-23 RX ORDER — HYDROCODONE BITARTRATE AND ACETAMINOPHEN 5; 325 MG/1; MG/1
2 TABLET ORAL ONCE AS NEEDED
Status: DISCONTINUED | OUTPATIENT
Start: 2024-08-23 | End: 2024-08-23

## 2024-08-23 RX ORDER — NICOTINE POLACRILEX 4 MG
15 LOZENGE BUCCAL
Status: DISCONTINUED | OUTPATIENT
Start: 2024-08-23 | End: 2024-08-23 | Stop reason: HOSPADM

## 2024-08-23 RX ORDER — HYDROMORPHONE HYDROCHLORIDE 1 MG/ML
0.4 INJECTION, SOLUTION INTRAMUSCULAR; INTRAVENOUS; SUBCUTANEOUS EVERY 5 MIN PRN
Status: DISCONTINUED | OUTPATIENT
Start: 2024-08-23 | End: 2024-08-23

## 2024-08-23 RX ORDER — HYDROMORPHONE HYDROCHLORIDE 1 MG/ML
0.2 INJECTION, SOLUTION INTRAMUSCULAR; INTRAVENOUS; SUBCUTANEOUS EVERY 5 MIN PRN
Status: DISCONTINUED | OUTPATIENT
Start: 2024-08-23 | End: 2024-08-23

## 2024-08-23 RX ORDER — DEXTROSE MONOHYDRATE 25 G/50ML
50 INJECTION, SOLUTION INTRAVENOUS
Status: DISCONTINUED | OUTPATIENT
Start: 2024-08-23 | End: 2024-08-23 | Stop reason: HOSPADM

## 2024-08-23 RX ORDER — MIDAZOLAM HYDROCHLORIDE 1 MG/ML
INJECTION INTRAMUSCULAR; INTRAVENOUS AS NEEDED
Status: DISCONTINUED | OUTPATIENT
Start: 2024-08-23 | End: 2024-08-23 | Stop reason: SURG

## 2024-08-23 RX ORDER — ONDANSETRON 2 MG/ML
INJECTION INTRAMUSCULAR; INTRAVENOUS AS NEEDED
Status: DISCONTINUED | OUTPATIENT
Start: 2024-08-23 | End: 2024-08-23 | Stop reason: SURG

## 2024-08-23 RX ORDER — HYDROMORPHONE HYDROCHLORIDE 1 MG/ML
0.6 INJECTION, SOLUTION INTRAMUSCULAR; INTRAVENOUS; SUBCUTANEOUS EVERY 5 MIN PRN
Status: DISCONTINUED | OUTPATIENT
Start: 2024-08-23 | End: 2024-08-23

## 2024-08-23 RX ORDER — MIDAZOLAM HYDROCHLORIDE 1 MG/ML
1 INJECTION INTRAMUSCULAR; INTRAVENOUS EVERY 5 MIN PRN
Status: DISCONTINUED | OUTPATIENT
Start: 2024-08-23 | End: 2024-08-23

## 2024-08-23 RX ORDER — LIDOCAINE HYDROCHLORIDE 20 MG/ML
INJECTION, SOLUTION INFILTRATION; PERINEURAL AS NEEDED
Status: DISCONTINUED | OUTPATIENT
Start: 2024-08-23 | End: 2024-08-23 | Stop reason: HOSPADM

## 2024-08-23 RX ORDER — HYDROCODONE BITARTRATE AND ACETAMINOPHEN 5; 325 MG/1; MG/1
1 TABLET ORAL ONCE AS NEEDED
Status: DISCONTINUED | OUTPATIENT
Start: 2024-08-23 | End: 2024-08-23

## 2024-08-23 RX ORDER — SCOLOPAMINE TRANSDERMAL SYSTEM 1 MG/1
1 PATCH, EXTENDED RELEASE TRANSDERMAL ONCE
Status: DISCONTINUED | OUTPATIENT
Start: 2024-08-23 | End: 2024-08-23 | Stop reason: HOSPADM

## 2024-08-23 RX ADMIN — ONDANSETRON 4 MG: 2 INJECTION INTRAMUSCULAR; INTRAVENOUS at 09:50:00

## 2024-08-23 RX ADMIN — MIDAZOLAM HYDROCHLORIDE 2 MG: 1 INJECTION INTRAMUSCULAR; INTRAVENOUS at 09:15:00

## 2024-08-23 RX ADMIN — SODIUM CHLORIDE, SODIUM LACTATE, POTASSIUM CHLORIDE, CALCIUM CHLORIDE: 600; 310; 30; 20 INJECTION, SOLUTION INTRAVENOUS at 09:15:00

## 2024-08-23 RX ADMIN — DEXAMETHASONE SODIUM PHOSPHATE 4 MG: 4 MG/ML VIAL (ML) INJECTION at 09:25:00

## 2024-08-23 RX ADMIN — LIDOCAINE HYDROCHLORIDE 50 MG: 10 INJECTION, SOLUTION EPIDURAL; INFILTRATION; INTRACAUDAL; PERINEURAL at 09:20:00

## 2024-08-23 RX ADMIN — KETOROLAC TROMETHAMINE 30 MG: 30 INJECTION, SOLUTION INTRAMUSCULAR; INTRAVENOUS at 09:50:00

## 2024-08-23 NOTE — DISCHARGE INSTRUCTIONS
After hysteroscopy instructions:    - Nothing in the vagina for 2 weeks.  - No strenuous activity/exercise for 48 hours (it can increase bleeding from the uterus.  - Please still take frequent walks. Stairs are fine.   - No tub baths for 2 weeks.   - May shower  - Use a pad for vaginal bleeding.  It is common to have spotting/light pink discharge for 2 weeks after your hysteroscopy.  Soaking (front-to-back, side-to-side) greater than 1 pad per hour with bright red vaginal bleeding should prompt you to go to the ED    Please call office if:  -fever 100.4 or higher    Please proceed to the Emergency Department at [facility] for any of the following:   - vaginal bleeding soaking greater than 1 pad per hour  - severe pelvic pain  - shortness of breath  - chest pain  - leg pain or swelling     You received a drug called Toradol which is an Anti Inflammatory at: 950am  If you are allowed to take Anti inflammatories:    Do not take any Anti Inflammatory like Motrin, Aleve or Ibuprophen until after: 350pm  Please report any suspected allergic reactions or bleeding issues to your doctor

## 2024-08-23 NOTE — DISCHARGE SUMMARY
[facility]   part of [facility]    Discharge Summary    Rubi Lee Patient Status:  Hospital Outpatient Surgery    1960 MRN [id number]   Location [facility] SURGERY Attending Noreen Henao,*   Hosp Day # 0 PCP Amber Stroud MD       Date of Admission: 2024    Date of Discharge: 2024    Patient is a 63 year old s/p hysteroscopy with polypectomy for cervical and endometrial polyps. Surgery and postoperative course were uncomplicated.  After meeting goals of postoperative care, patient was discharged home with planned follow up with her OB provider in within 2 weeks

## 2024-08-23 NOTE — ANESTHESIA PREPROCEDURE EVALUATION
PRE-OP EVALUATION    Patient Name: Rubi Lee    Admit Diagnosis: Vaginitis and vulvovaginitis, unspecified [N76.0]  Abnormal uterine bleeding (AUB) [N93.9]  Inclusion cyst of vulva [N90.7]  Cervical polyp [N84.1]  FH: uterine cancer [Z80.49]  Perimenopausal vasomotor symptoms [N95.1]  Postmenopausal bleeding [N95.0]    Pre-op Diagnosis: Vaginitis and vulvovaginitis, unspecified [N76.0]  Abnormal uterine bleeding (AUB) [N93.9]  Inclusion cyst of vulva [N90.7]  Cervical polyp [N84.1]  FH: uterine cancer [Z80.49]  Perimenopausal vasomotor symptoms [N95.1]  Postmenopausal bleeding [N95.0]    Truclear HYSTEROSCOPY DILATION AND CURETTAGE, Polypectomy    Anesthesia Procedure: Truclear HYSTEROSCOPY DILATION AND CURETTAGE, Polypectomy    Surgeons and Role:     * Noreen Henao MD - Primary    Pre-op vitals reviewed.  Temp: 98.1 °F (36.7 °C)  Pulse: 83  Resp: 17  BP: 136/76  SpO2: 97 %  Body mass index is 41.45 kg/m².    Current medications reviewed.  Hospital Medications:   [Transfer Hold] acetaminophen (Tylenol Extra Strength) tab 1,000 mg  1,000 mg Oral Once    [Transfer Hold] scopolamine (Transderm-Scop) 1 MG/3DAYS patch 1 patch  1 patch Transdermal Once    [Transfer Hold] glucose (Dex4) 15 GM/59ML oral liquid 15 g  15 g Oral Q15 Min PRN    Or    [Transfer Hold] glucose (Glutose) 40% oral gel 15 g  15 g Oral Q15 Min PRN    Or    [Transfer Hold] glucose-vitamin C (Dex-4) chewable tab 4 tablet  4 tablet Oral Q15 Min PRN    Or    [Transfer Hold] dextrose 50% injection 50 mL  50 mL Intravenous Q15 Min PRN    Or    [Transfer Hold] glucose (Dex4) 15 GM/59ML oral liquid 30 g  30 g Oral Q15 Min PRN    Or    [Transfer Hold] glucose (Glutose) 40% oral gel 30 g  30 g Oral Q15 Min PRN    Or    [Transfer Hold] glucose-vitamin C (Dex-4) chewable tab 8 tablet  8 tablet Oral Q15 Min PRN    lactated ringers infusion   Intravenous Continuous       Outpatient Medications:     Facility-Administered Medications Prior to  Admission   Medication Dose Route Frequency Provider Last Rate Last Admin    [COMPLETED] triamcinolone acetonide (Kenalog-40) 40 MG/ML injection 80 mg  80 mg Intra-articular Once Joe Andersen PA-C   80 mg at 07/03/24 1247     Medications Prior to Admission   Medication Sig Dispense Refill Last Dose    metFORMIN 500 MG Oral Tab Take 1 tablet (500 mg total) by mouth daily.   8/22/2024 at 2100    pantoprazole 40 MG Oral Tab EC Take 1 tablet (40 mg total) by mouth every morning before breakfast. 90 tablet 0 Unknown    fluticasone propionate 50 MCG/ACT Nasal Suspension 2 sprays by Each Nare route daily as needed for Rhinitis. (Patient not taking: Reported on 8/9/2024) 18.2 mL 0 Unknown    atorvastatin 20 MG Oral Tab Take 0.5 tablets (10 mg total) by mouth nightly. (Patient not taking: Reported on 7/31/2024)   Unknown       Allergies: Patient has no known allergies.      Anesthesia Evaluation    Patient summary reviewed.    Anesthetic Complications  (-) history of anesthetic complications         GI/Hepatic/Renal      (+) GERD                           Cardiovascular        Exercise tolerance: good     MET: >4    (+) obesity  (+) hypertension   (+) hyperlipidemia                                  Endo/Other      (+) diabetes  type 2,                          Pulmonary    Negative pulmonary ROS.                       Neuro/Psych    Negative neuro/psych ROS.                                  Past Surgical History:   Procedure Laterality Date    Cholecystectomy      Thyroidectomy      partial per pt- reports done ~2000. denies cancer hx     Social History     Socioeconomic History    Marital status:    Tobacco Use    Smoking status: Never    Smokeless tobacco: Never   Vaping Use    Vaping status: Never Used   Substance and Sexual Activity    Alcohol use: Never    Drug use: Never   Other Topics Concern    Caffeine Concern No    Exercise Yes    Seat Belt No    Special Diet No    Stress Concern Yes    Weight  Concern No     History   Drug Use Unknown     Available pre-op labs reviewed.  Lab Results   Component Value Date    WBC 9.6 08/13/2024    RBC 4.82 08/13/2024    HGB 14.8 08/13/2024    HCT 43.8 08/13/2024    MCV 90.9 08/13/2024    MCH 30.7 08/13/2024    MCHC 33.8 08/13/2024    RDW 14.1 08/13/2024    .0 08/13/2024     Lab Results   Component Value Date     06/10/2024    K 4.3 06/10/2024     06/10/2024    CO2 25.0 06/10/2024    BUN 12 06/10/2024    CREATSERUM 0.78 06/10/2024     (H) 06/10/2024    CA 9.2 06/10/2024            Airway      Mallampati: III  Mouth opening: >3 FB  TM distance: > 6 cm  Neck ROM: full Cardiovascular    Cardiovascular exam normal.  Rhythm: regular  Rate: normal     Dental      Dental appliance(s): upper dentures       Pulmonary    Pulmonary exam normal.  Breath sounds clear to auscultation bilaterally.               Other findings              ASA: 3   Plan: MAC  NPO status verified and patient meets guidelines.  Patient has not taken beta blockers in last 24 hours.  Post-procedure pain management plan discussed with surgeon and patient.      Plan/risks discussed with: patient and spouse                Present on Admission:  **None**

## 2024-08-23 NOTE — OPERATIVE REPORT
Operative Report:     Rubi Lee  : 1960     Date of procedure: 24    INDICATIONS:   63 year old  female with postmenopausal bleeding and cervical polyp presenting for hysteroscopy dilation and curettage     PRE-OP DIAGNOSIS:   Cervical polyp  Post-menopausal bleeding       POST-OP DIAGNOSIS:   Cervical polyp  Endometrial polyp  Post-menopausal bleeding    PROCEDURE(S):   Hysteroscopy, Dilation and curettage of uterus using Truclear hysteroscopic morcellator     ANESTHESIA:  MAC with paracervical block     SURGEON(S): Noreen Henao MD     ESTIMATED BLOOD LOSS: 5 mL           DRAINS: None            UTERINE DISTENSION MEDIUM: Normal Saline 0.9%  DEFICIT: 150 mL     SPECIMENS: Endometrial and cervical polyps             COMPLICATIONS:  None apparent     FINDINGS: Normal external genitalia, no lesions. Normal cervix, no lesions. Anteverted uterus. Endometrial cavity normal in shape. Endometrium with polyp. Bilateral tubal ostia seen.     PROCEDURE: This procedure was fully reviewed with the patient and written informed consent was obtained after discussing risks, benefits, indication and alternatives. All questions were answered.     The patient was taken to operating room. SCDs were placed. MAC was initiated. She was placed in dorsal lithotomy position. Exam under anesthesia performed. She was prepped and draped in normal sterile fashion. The bladder was drained. A sterile bivalved speculum was placed in the vagina. A single tooth tenaculum was used to grasp the anterior lip of the cervix. The cervix was gently dilated.     The hysteroscopic system was calibrated. The hysteroscope was gently advanced into the endometrial cavity. The uterine cavity was visualized and the previous findings noted. The TruClear Mini hysteroscopic blade was then advanced through the hysteroscope under direct visualization applied to sample the tissue of the endometrium and cervix along all the walls of  the uterus and endocervix. The tissue was sent to pathology.     The hysteroscope was then removed from the uterus. The single tooth tenaculum was removed and good hemostasis was noted. Sponge, lap, needle, and instrument counts correct by two counts. The patient was taken to recovery room in stable condition.     DISPOSITION:  To recovery room in stable condition        CONDITION: Stable      Noreen Henao MD   [facility] - OBGYN

## 2024-08-23 NOTE — ANESTHESIA POSTPROCEDURE EVALUATION
[facility]    Rubi Lee Patient Status:  Hospital Outpatient Surgery   Age/Gender 63 year old female MRN [id number]   Location [facility] PERIOPERATIVE SERVICE Attending No att. providers found   Hosp Day # 0 PCP Amber Stroud MD       Anesthesia Post-op Note    Truclear HYSTEROSCOPY DILATION AND CURETTAGE, Polypectomy    Procedure Summary       Date: 08/23/24 Room / Location:  MAIN OR 06 /  MAIN OR    Anesthesia Start: 0915 Anesthesia Stop: 1016    Procedure: Truclear HYSTEROSCOPY DILATION AND CURETTAGE, Polypectomy Diagnosis:       Vaginitis and vulvovaginitis, unspecified      Abnormal uterine bleeding (AUB)      Inclusion cyst of vulva      Cervical polyp      FH: uterine cancer      Perimenopausal vasomotor symptoms      Postmenopausal bleeding      (same)    Surgeons: Noreen Henao MD Anesthesiologist: Jerry Galvan MD    Anesthesia Type: MAC ASA Status: 3            Anesthesia Type: MAC    Vitals Value Taken Time   /82 08/23/24 1017   Temp 97.5 08/23/24 1017   Pulse 67 08/23/24 1017   Resp 18 08/23/24 1017   SpO2 93 % 08/23/24 1017       Patient Location: Same Day Surgery    Anesthesia Type: MAC    Airway Patency: patent    Postop Pain Control: adequate    Mental Status: mildly sedated but able to meaningfully participate in the post-anesthesia evaluation    Nausea/Vomiting: none    Cardiopulmonary/Hydration status: stable euvolemic    Complications: no apparent anesthesia related complications    Postop vital signs: stable    Dental Exam: Unchanged from Preop    Patient to be discharged home when criteria met.

## 2024-08-28 DIAGNOSIS — R09.82 POST-NASAL DRIP: ICD-10-CM

## 2024-08-28 NOTE — TELEPHONE ENCOUNTER
Requesting    Name from pharmacy: Fluticasone Propionate 50 MCG/ACT Nasal Suspension         Will file in chart as: FLUTICASONE PROPIONATE 50 MCG/ACT Nasal Suspension    Sig: USE 2 SPRAY(S) IN EACH NOSTRIL ONCE DAILY AS NEEDED FOR RUNNY NOSE    Disp: 16 g    Refills: 0    Start: 8/28/2024    Class: Normal    Non-formulary For: Post-nasal drip    Last ordered: 1 month ago (7/9/2024) by Amber Stroud MD    Last refill: 7/9/2024    Rx #: [id number]    Allergy Medication Protocol [id number]/28/2024 02:16 PM   Protocol Details In person appointment or virtual visit in the past 12 mos or appointment in next 3 mos      To be filled at: [facility] [address] [place] [address] [phone], [phone]        LOV: 07/09/24 w/ DVF  RTC: 09/16/24  Last Relevant Labs: 06/10/24    Future Appointments   Date Time Provider Department Center   9/11/2024 12:30 PM Noreen Henao MD [facility] OB[facility]N M [facility] [place]   9/16/2024  1:00 PM Amber Dennis MD [facility] 8 [facility] [place]   10/2/2024  8:15 AM  MR RM4 (3T WIDE)  [facility]

## 2024-08-29 RX ORDER — FLUTICASONE PROPIONATE 50 MCG
2 SPRAY, SUSPENSION (ML) NASAL DAILY PRN
Qty: 16 G | Refills: 0 | Status: SHIPPED | OUTPATIENT
Start: 2024-08-29

## 2024-09-11 ENCOUNTER — OFFICE VISIT (OUTPATIENT)
Facility: CLINIC | Age: 64
End: 2024-09-11
Payer: COMMERCIAL

## 2024-09-11 VITALS
BODY MASS INDEX: 42.52 KG/M2 | SYSTOLIC BLOOD PRESSURE: 136 MMHG | DIASTOLIC BLOOD PRESSURE: 82 MMHG | WEIGHT: 246 LBS | HEART RATE: 98 BPM | HEIGHT: 63.9 IN

## 2024-09-11 DIAGNOSIS — N90.7 INCLUSION CYST OF VULVA: ICD-10-CM

## 2024-09-11 DIAGNOSIS — Z98.890 POST-OPERATIVE STATE: Primary | ICD-10-CM

## 2024-09-11 PROCEDURE — 99024 POSTOP FOLLOW-UP VISIT: CPT | Performed by: STUDENT IN AN ORGANIZED HEALTH CARE EDUCATION/TRAINING PROGRAM

## 2024-09-11 NOTE — PROGRESS NOTES
[facility]  Obstetrics and Gynecology   History & Physical      Chief complaint:   Chief Complaint   Patient presents with    Follow - Up     ruclear HYSTEROSCOPY DILATION AND CURETTAGE, Polypectomy       Subjective:     HPI: Rubi Lee is a 63 year old  s/p hysc D&C surgery on 2024 for AUB, polyp is presenting for post-operative check    - Pathology:  Endometrial and endocervical polyp:  -Endometrial polyp.  -Polypoid fragment of endocervical tissue with chronic inflammation.  -Negative for malignancy.    Post op course:  - Vaginal bleeding: none since surgery  - BM: baseline  - Urinary: basleine  - Mood:baseline  Reports her vulvar cyst returned last week but that it went away after applying clindamycin cream     ROS negative unless otherwise stated above    OB History  OB History    Para Term  AB Living   4 4 0 0 0 4   SAB IAB Ectopic Multiple Live Births   0 0 0 0 4     OB History    Para Term  AB Living   4 4       4   SAB IAB Ectopic Multiple Live Births           4      # Outcome Date GA Lbr Breezy/2nd Weight Sex Type Anes PTL Lv   4 Para      NORMAL SPONT   NAIDA   3 Para      NORMAL SPONT   NAIDA   2 Para      NORMAL SPONT   NAIDA   1 Para      NORMAL SPONT   NAIDA       Meds:  Current Outpatient Medications on File Prior to Visit   Medication Sig Dispense Refill    atorvastatin 20 MG Oral Tab Take 0.5 tablets (10 mg total) by mouth nightly.      metFORMIN 500 MG Oral Tab Take 1 tablet (500 mg total) by mouth daily.      FLUTICASONE PROPIONATE 50 MCG/ACT Nasal Suspension USE 2 SPRAY(S) IN EACH NOSTRIL ONCE DAILY AS NEEDED FOR RUNNY NOSE (Patient not taking: Reported on 2024) 16 g 0    pantoprazole 40 MG Oral Tab EC Take 1 tablet (40 mg total) by mouth every morning before breakfast. (Patient not taking: Reported on 2024) 90 tablet 0     No current facility-administered medications on file prior to visit.       PMH:  Past Medical History:    Diabetes (HCC)     Disorder of thyroid    Esophageal reflux    Hearing impaired person, bilateral    R EAR LOSS HEARING    High cholesterol    Hyperlipidemia       All:  No Known Allergies    PSH:  Past Surgical History:   Procedure Laterality Date    Cholecystectomy      Thyroidectomy      partial per pt- reports done ~. denies cancer hx       Social History:  Social History     Socioeconomic History    Marital status:    Tobacco Use    Smoking status: Never    Smokeless tobacco: Never   Vaping Use    Vaping status: Never Used   Substance and Sexual Activity    Alcohol use: Never    Drug use: Never    Sexual activity: Not Currently   Other Topics Concern    Caffeine Concern No    Exercise Yes    Seat Belt No    Special Diet No    Stress Concern Yes    Weight Concern No     Social Determinants of Health     Financial Resource Strain: Not on File (10/7/2022)    Received from [facility]    Financial Resource Strain     Financial Resource Strain: 0   Food Insecurity: Declined (2023)    Received from [facility]    Food Insecurity     Food: 99   Transportation Needs: Not on File (10/7/2022)    Received from [facility]    Transportation Needs     Transportation: 0   Physical Activity: Not on File (10/7/2022)    Received from [facility]    Physical Activity     Physical Activity: 0   Stress: Not on File (10/7/2022)    Received from [facility]    Stress     Stress: 0   Social Connections: Not on File (2024)    Received from [facility]    Social Connections     Connectedness: 0   Housing Stability: Declined (2023)    Received from [facility]    Housing Stability     Housin        Family History:  Family History   Problem Relation Age of Onset    Cancer Mother     Stroke Paternal Grandfather     Heart Disease Neg          Objective:     Vitals:    24 1205   BP: 136/82   Pulse: 98   Weight: 246 lb (111.6 kg)   Height: 63.9\"       Body mass index is 42.36 kg/m².    Physical Exam:     General: normal appearance  HEENT: normocephalic, no male  pattern baldness, no acne  Breast: normal contour  Respiratory: normal work of breathing, no extra use of accessory muscles  Cardiac: normal rate  Abdominal: Nontender to palpation  MSK: normal range of motion  Neuro: normal movement, normal sensory  Skin: no abnormalities seen    Pelvic exam  - normal appearing perineum, anus  - bilateral labia majora are with multiple inclusion cysts - no infection seen, not expressable  - normal appearing urethral meatus, urethra  - normal appearing vagina, well estrogenized with ruggae, physiologic discharge  - multiparous cervix      Labs:  Lab Results   Component Value Date    WBC 9.6 2024    RBC 4.82 2024    HGB 14.8 2024    HCT 43.8 2024    MCV 90.9 2024    MCH 30.7 2024    MCHC 33.8 2024    RDW 14.1 2024    .0 2024        Lab Results   Component Value Date     (H) 06/10/2024    BUN 12 06/10/2024    BUNCREA 15.4 06/10/2024    CREATSERUM 0.78 06/10/2024    ANIONGAP 8 06/10/2024    CA 9.2 06/10/2024    OSMOCALC 296 (H) 06/10/2024    ALKPHO 106 06/10/2024    AST 24 06/10/2024    ALT 19 06/10/2024    BILT 0.4 06/10/2024    TP 7.9 06/10/2024    ALB 4.6 06/10/2024    GLOBULIN 3.3 06/10/2024     06/10/2024    K 4.3 06/10/2024     06/10/2024    CO2 25.0 06/10/2024         Assessment:     Rubi Lee is a 63 year old  s/p hysc D&C on 2024 for AUB, polyp is presenting for post-operative check    #post operative  - meeting goals of care    #vulvar inclusion cyst  - benign findings  #Vaginal hygiene  - clean with warm water (no soap)  - pat dry  - no douching  - avoid shaving (clipping/cutting ok).  If unable to avoid, consider using laser hair removal  - cotton underwear  - avoid fragrant detergents    #Lifestyle counseling based on recommendations from the American College of Lifestyle Medicine  1) Nutrition: extensive scientific evidence supports a diet that is predominantly whole-food and  plant based as an important strategy in health optimization.  Such a diet is rich in fiber, antioxidants and is nutrient dense (ex. Minimally processed vegetables, fruits, whole grains, legumes, nuts and seeds)  2) Physical activity: at least 150 minutes of moderate exercise per week (anything that gets your heart beating faster).  You could divide this time into 30 minutes per day for 5 days.  2 of these days should be devoted to whole body muscle-strengthening/building activities (weight lifting, for example).  3) Sleep: 7 to 9 hours per night of restorative sleep.  You can use black out curtains, white noise machine and minimize screen time to do this.    4) Continue to avoid or limit risky substances like alcohol, nicotine, vaping, drugs, prescription opioids.  If you need help - through medication or counseling - to do this, please contact me so I can get you a referral or resources to support you.  5) Stress: Continue developing coping strategies to decrease stress.  6) Leverage the power of relationships and social networks to help reinforce health behaviors.  Studies show people are more successful at achieving health goals if the people they live with are supporting and even working towards those same health goals.      Noreen Henao MD   [facility] - OBGYN    Note to patient and family:  The 21st Century Cures Act makes medical notes available to patients in the interest of transparency.  However, please be advised that this is a medical document.  It is intended as a peer to peer communication.  It is written in medical language and may contain abbreviations or verbiage that are technical and unfamiliar.  It may appear blunt or direct.  Medical documents are intended to carry relevant information, facts as evident, and the clinical opinion of the practitioner.

## 2024-09-16 ENCOUNTER — OFFICE VISIT (OUTPATIENT)
Dept: INTERNAL MEDICINE CLINIC | Facility: CLINIC | Age: 64
End: 2024-09-16
Payer: COMMERCIAL

## 2024-09-16 VITALS
SYSTOLIC BLOOD PRESSURE: 134 MMHG | RESPIRATION RATE: 18 BRPM | DIASTOLIC BLOOD PRESSURE: 86 MMHG | BODY MASS INDEX: 43.45 KG/M2 | HEIGHT: 63.9 IN | TEMPERATURE: 98 F | HEART RATE: 80 BPM | OXYGEN SATURATION: 100 % | WEIGHT: 251.38 LBS

## 2024-09-16 DIAGNOSIS — K29.60 REFLUX GASTRITIS: ICD-10-CM

## 2024-09-16 DIAGNOSIS — E78.5 HYPERLIPIDEMIA, UNSPECIFIED HYPERLIPIDEMIA TYPE: ICD-10-CM

## 2024-09-16 DIAGNOSIS — Z12.11 COLON CANCER SCREENING: ICD-10-CM

## 2024-09-16 DIAGNOSIS — E66.01 MORBID (SEVERE) OBESITY DUE TO EXCESS CALORIES (HCC): ICD-10-CM

## 2024-09-16 DIAGNOSIS — M17.0 OSTEOARTHRITIS OF BOTH KNEES, UNSPECIFIED OSTEOARTHRITIS TYPE: ICD-10-CM

## 2024-09-16 DIAGNOSIS — E11.9 TYPE 2 DIABETES MELLITUS WITHOUT COMPLICATION, WITHOUT LONG-TERM CURRENT USE OF INSULIN (HCC): Primary | ICD-10-CM

## 2024-09-16 LAB
CARTRIDGE EXPIRATION DATE: 2025 DATE
CARTRIDGE LOT#: 7155 NUMERIC
HEMOGLOBIN A1C: 6.7 % (ref 4.3–5.6)

## 2024-09-16 NOTE — PROGRESS NOTES
Subjective:   Rubi Lee is a 63 year old female  who presents for Follow - Up     GERD- controlled on PPI   DM2/obese -on metformin. Tolerating it.   States that at home fasting BG ~140s   HLD- on statin. Controlled.   BL OA knees/pain- s/p injection 7/2024. Has follow-up scheduled.   History/Other:    Chief Complaint Reviewed and Verified  No Further Nursing Notes to   Review  Tobacco Reviewed  Allergies Reviewed  Medications Reviewed    Medical History Reviewed  Surgical History Reviewed  OB Status Reviewed    Family History Reviewed  Social History Reviewed         Current Outpatient Medications   Medication Sig Dispense Refill    atorvastatin 20 MG Oral Tab Take 0.5 tablets (10 mg total) by mouth nightly.      metFORMIN 500 MG Oral Tab Take 1 tablet (500 mg total) by mouth daily.      FLUTICASONE PROPIONATE 50 MCG/ACT Nasal Suspension USE 2 SPRAY(S) IN EACH NOSTRIL ONCE DAILY AS NEEDED FOR RUNNY NOSE (Patient not taking: Reported on 9/11/2024) 16 g 0    pantoprazole 40 MG Oral Tab EC Take 1 tablet (40 mg total) by mouth every morning before breakfast. (Patient not taking: Reported on 9/11/2024) 90 tablet 0       Review of Systems:  Pertinent items are noted in HPI.  A comprehensive 10 point review of systems was completed.  Pertinent positives and negatives noted in the the HPI.        Objective:   /86 (BP Location: Left arm, Patient Position: Sitting, Cuff Size: adult)   Pulse 80   Temp 98.3 °F (36.8 °C) (Temporal)   Resp 18   Ht 5' 3.9\" (1.623 m)   Wt 251 lb 6.4 oz (114 kg)   SpO2 100%   BMI 43.29 kg/m²  Estimated body mass index is 43.29 kg/m² as calculated from the following:    Height as of this encounter: 5' 3.9\" (1.623 m).    Weight as of this encounter: 251 lb 6.4 oz (114 kg).  PHYSICAL EXAM:   General: no acute distress   Respiratory: no increased work of breathing; good air exchange; CTAB; no crackles or wheezing   Cardiovascular: RRR; S1, S2; no murmurs;   Neurological:  awake, alert, oriented x3; CNII-XII grossly intact;  MSK: knee stiffness   Behavioral/Psych: euthymic; appropriate affect   Skin: no leg rashes     Assessment & Plan:   Rubi was seen today for follow - up.    Diagnoses and all orders for this visit:    Type 2 diabetes mellitus without complication, without long-term current use of insulin (HCC)  Morbid (severe) obesity due to excess calories ([facility])  -     POC Hemoglobin A1C  -  increase to   metFORMIN 500 MG Oral Tab; Take 1 tablet (500 mg total) by mouth 2 (two) times daily with meals.  -     Hemoglobin A1C [E]; Future 6 months   -     Lipid Panel [E]; Future        Hyperlipidemia, unspecified hyperlipidemia type  -continue statin. Repeat labs in 6 months     Reflux gastritis  -ppi and avoid triggers     Colon cancer screening  -     Gastro Referral - In Network    Osteoarthritis of both knees, unspecified osteoarthritis type  -continue home exercises and follow-up with ortho                Amber Stroud MD

## 2024-10-16 ENCOUNTER — HOSPITAL ENCOUNTER (OUTPATIENT)
Dept: MRI IMAGING | Facility: HOSPITAL | Age: 64
Discharge: HOME | End: 2024-10-16
Attending: OTOLARYNGOLOGY
Payer: COMMERCIAL

## 2024-10-16 DIAGNOSIS — H90.3 ASYMMETRIC SNHL (SENSORINEURAL HEARING LOSS): ICD-10-CM

## 2024-10-16 PROCEDURE — A9575 INJ GADOTERATE MEGLUMI 0.1ML: HCPCS | Performed by: OTOLARYNGOLOGY

## 2024-10-16 PROCEDURE — 70553 MRI BRAIN STEM W/O & W/DYE: CPT | Performed by: OTOLARYNGOLOGY

## 2024-10-16 RX ORDER — DIPHENHYDRAMINE HYDROCHLORIDE 50 MG/ML
10 INJECTION, SOLUTION INTRAMUSCULAR; INTRAVENOUS
Status: COMPLETED | OUTPATIENT
Start: 2024-10-16 | End: 2024-10-16

## 2024-10-16 RX ADMIN — DIPHENHYDRAMINE HYDROCHLORIDE 20 ML: 50 INJECTION, SOLUTION INTRAMUSCULAR; INTRAVENOUS at 22:12:00

## 2025-01-09 ENCOUNTER — TELEPHONE (OUTPATIENT)
Dept: ORTHOPEDICS CLINIC | Facility: CLINIC | Age: 65
End: 2025-01-09

## 2025-01-09 NOTE — TELEPHONE ENCOUNTER
Patient called for lidia knee pain. Please advise for xrays  Future Appointments   Date Time Provider Department Center   1/22/2025 10:40 AM Edwardo Cox MD [facility] OR[facility]O 75 [facility]   2/13/2025 10:00 AM Benjamin Meredith MD [id number]

## 2025-01-22 ENCOUNTER — OFFICE VISIT (OUTPATIENT)
Dept: ORTHOPEDICS CLINIC | Facility: CLINIC | Age: 65
End: 2025-01-22
Payer: COMMERCIAL

## 2025-01-22 VITALS — BODY MASS INDEX: 44.51 KG/M2 | WEIGHT: 251.19 LBS | HEIGHT: 63 IN

## 2025-01-22 DIAGNOSIS — E66.01 MORBID OBESITY (HCC): ICD-10-CM

## 2025-01-22 DIAGNOSIS — M17.0 PRIMARY OSTEOARTHRITIS OF BOTH KNEES: Primary | ICD-10-CM

## 2025-01-22 NOTE — H&P
[facility] New Patient Note    CC:   Chief Complaint   Patient presents with    Knee Pain     Bilateral knee pain   Right knee is more bothersome        HPI: This 64 year old female presents today with complaints of bilateral knee pain right worse than left.  She notes symptoms that have been ongoing for years and worsening.  She is present with her son today who helps present history.  They state pain is all around the knee, worse with weightbearing activities.  In the past she has done steroid injections, these did not provide relief, last steroid injection was in July 2024 by Joe.  She has done viscosupplementation 3 to 4 years ago, notes that this did provide excellent relief.  Pain is activity and lifestyle limiting.    Past Medical History:    Diabetes (HCC)    Disorder of thyroid    Esophageal reflux    Hearing impaired person, bilateral    R EAR LOSS HEARING    High cholesterol    Hyperlipidemia     Past Surgical History:   Procedure Laterality Date    Cholecystectomy      Thyroidectomy      partial per pt- reports done ~2000. denies cancer hx     Current Outpatient Medications   Medication Sig Dispense Refill    metFORMIN 500 MG Oral Tab Take 1 tablet (500 mg total) by mouth 2 (two) times daily with meals. 180 tablet 3    FLUTICASONE PROPIONATE 50 MCG/ACT Nasal Suspension USE 2 SPRAY(S) IN EACH NOSTRIL ONCE DAILY AS NEEDED FOR RUNNY NOSE 16 g 0    pantoprazole 40 MG Oral Tab EC Take 1 tablet (40 mg total) by mouth every morning before breakfast. 90 tablet 0    atorvastatin 20 MG Oral Tab Take 1 tablet (20 mg total) by mouth nightly.       Allergies[1]  Family History   Problem Relation Age of Onset    Cancer Mother     Stroke Paternal Grandfather     Heart Disease Neg      Social History     Occupational History    Not on file   Tobacco Use    Smoking status: Never    Smokeless tobacco: Never    Tobacco comments:     Updated 1/22/25   Vaping Use    Vaping status: Never Used   Substance and Sexual  Activity    Alcohol use: Never    Drug use: Never    Sexual activity: Not Currently        ROS:  Detailed system review obtained and negative except as mentioned above      Physical Exam:    Ht 5' 3\" (1.6 m)   Wt 251 lb 3.2 oz (113.9 kg)   BMI 44.50 kg/m²   Constitutional: Awake, alert, no distress.   Psychological: Appropriate affect.  Respiratory: Unlabored breathing.  Bilateral lower extremity:  Inspection: skin intact  Palpation: Mild tenderness about the lateral joint line of the right knee  Range of motion: Extension of both knees about 5-10 short of full, more so on the right; flexion around 100  Knee stable to varus valgus stress  Neuromuscular: 5 out of 5 quad strength  Vascular: Warm well-perfused      Imaging: X-rays of both the right and left knees from July 2024 personally viewed, independently interpreted and radiology report read.  Demonstrates [place] grade 3 tibiofemoral degenerative changes with medial joint space loss definite, tricompartmental osteophytes, subchondral sclerosis.  [place] view demonstrates severe bone-on-bone patellofemoral degenerative arthritis.      Labs: Hemoglobin A1c from June 2024 was 6.9      Assessment/Plan:  Diagnoses and all orders for this visit:    Primary osteoarthritis of both knees    Morbid obesity (HCC)      Assessment: 64-year-old female with symptomatic radiographically severe bilateral knee osteoarthritis    Plan: I discussed the etiology, natural history, and management options for symptomatic knee osteoarthritis.  I discussed nonsurgical and surgical treatments, with nonsurgical treatments to include anti-inflammatory medications, injections, activity modification, weight loss, low impact exercise and possible therapy.  Surgery would be with knee replacement and is an elective operation reserved for when nonsurgical treatments no longer alleviate symptoms sufficiently.  We discussed the importance of optimizing modifiable comorbidities due to  increased surgical complication risk.  They are working on optimizing/improving body mass index.  Spent additional time counseling her on this.  They would like to proceed with Visco injection, and I did place an order for this.  We discussed the risks and benefits of injection procedure.  They will follow-up for injection appointment when approved.    [facility]o Cox MD, FAAOS  [facility]  Phone [phone]  Fax [phone]       [1] No Known Allergies

## 2025-01-27 ENCOUNTER — TELEPHONE (OUTPATIENT)
Dept: ORTHOPEDICS CLINIC | Facility: CLINIC | Age: 65
End: 2025-01-27

## 2025-02-13 ENCOUNTER — OFFICE VISIT (OUTPATIENT)
Facility: LOCATION | Age: 65
End: 2025-02-13
Payer: COMMERCIAL

## 2025-02-13 DIAGNOSIS — H90.3 ASYMMETRIC SNHL (SENSORINEURAL HEARING LOSS): Primary | ICD-10-CM

## 2025-02-13 PROCEDURE — 99213 OFFICE O/P EST LOW 20 MIN: CPT | Performed by: OTOLARYNGOLOGY

## 2025-02-13 NOTE — PROGRESS NOTES
Rubi Lee is a 64 year old female.   Chief Complaint   Patient presents with    Test Results     HPI:   History is obtained with .  She has a history of hearing loss.    REVIEW OF SYSTEMS:   GENERAL HEALTH: feels well otherwise  GENERAL : denies fever, chills, sweats, weight loss, weight gain  SKIN: denies any unusual skin lesions or rashes  RESPIRATORY: denies shortness of breath with exertion  NEURO: denies headaches    EXAM:   There were no vitals taken for this visit.    System Findings Details   Constitutional  Overall appearance - Normal.   Psychiatric  Orientation - Oriented to time, place, person & situation. Appropriate mood and affect.   Head/Face  Facial features -- Normal. Skull - Normal.   Eyes  Pupils equal ,round ,react to light and accomidate   Ears, Nose, Throat, Neck  Tympanic membrane's clear bilaterally no infection   Neurological  Memory - Normal. Cranial nerves - Cranial nerves II through XII grossly intact.   Lymph Detail  Submental. Submandibular. Anterior cervical. Posterior cervical. Supraclavicular.       ASSESSMENT AND PLAN:   1. Asymmetric SNHL (sensorineural hearing loss)  MRI scan reviewed.  This shows no significant pathology.  She is medically cleared for CROS hearing aid.  She will follow-up with audiology.      The patient indicates understanding of these issues and agrees to the plan.    Return in about 1 year (around 2/13/2026) for audiogram.    Benjamin Meredith MD  2/13/2025  11:06 AM

## 2025-02-20 ENCOUNTER — TELEPHONE (OUTPATIENT)
Dept: ORTHOPEDICS CLINIC | Facility: CLINIC | Age: 65
End: 2025-02-20

## 2025-02-21 ENCOUNTER — TELEPHONE (OUTPATIENT)
Dept: ORTHOPEDICS CLINIC | Facility: CLINIC | Age: 65
End: 2025-02-21

## 2025-04-02 ENCOUNTER — HOSPITAL ENCOUNTER (EMERGENCY)
Facility: HOSPITAL | Age: 65
Discharge: HOME | End: 2025-04-02
Attending: EMERGENCY MEDICINE
Payer: COMMERCIAL

## 2025-04-02 VITALS
TEMPERATURE: 98 F | BODY MASS INDEX: 44.16 KG/M2 | RESPIRATION RATE: 16 BRPM | SYSTOLIC BLOOD PRESSURE: 114 MMHG | HEART RATE: 84 BPM | OXYGEN SATURATION: 96 % | WEIGHT: 240 LBS | DIASTOLIC BLOOD PRESSURE: 78 MMHG | HEIGHT: 62 IN

## 2025-04-02 DIAGNOSIS — N76.4 LEFT GENITAL LABIAL ABSCESS: Primary | ICD-10-CM

## 2025-04-02 PROCEDURE — 56405 I&D VULVA/PERINEAL ABSCESS: CPT

## 2025-04-02 PROCEDURE — 99284 EMERGENCY DEPT VISIT MOD MDM: CPT

## 2025-04-02 PROCEDURE — 99283 EMERGENCY DEPT VISIT LOW MDM: CPT

## 2025-04-02 NOTE — ED PROVIDER NOTES
Patient Seen in: [facility] Emergency Department      History     Chief Complaint   Patient presents with    Eval-G     Bulge on vagina      Stated Complaint: Eval G    Subjective:   HPI      Patient presents with a painful swelling in the labial area.  The patient states that she has had this happen off and on where the area will become swollen and painful and then it seems to go back down.  It sounds like it has been effectively treated with antibiotics in the past.  The patient states she has never had it drained.  It has been bothering her again in the recent days and has not been draining.  She does not feel any fevers or chills.  She states her blood sugars well-controlled.    Objective:     Past Medical History:    Diabetes (HCC)    Disorder of thyroid    Esophageal reflux    Hearing impaired person, bilateral    R EAR LOSS HEARING    High cholesterol    Hyperlipidemia              Past Surgical History:   Procedure Laterality Date    Cholecystectomy      Thyroidectomy      partial per pt- reports done ~. denies cancer hx                Social History     Socioeconomic History    Marital status:    Tobacco Use    Smoking status: Never    Smokeless tobacco: Never    Tobacco comments:     Updated 25   Vaping Use    Vaping status: Never Used   Substance and Sexual Activity    Alcohol use: Never    Drug use: Never    Sexual activity: Not Currently   Other Topics Concern    Caffeine Concern No    Exercise Yes    Seat Belt No    Special Diet No    Stress Concern Yes    Weight Concern No     Social Drivers of Health     Food Insecurity: Not on File (2023)    Received from [employer]    Food Insecurity     Food: 0   Transportation Needs: Not on File (10/7/2022)    Received from [employer]    Transportation Needs     Transportation: 0   Housing Stability: Not on File (2023)    Received from [employer]    Housing Stability     Housin                  Physical Exam     ED Triage Vitals [25  0828]   /79   Pulse 100   Resp 16   Temp 98.4 °F (36.9 °C)   Temp src Temporal   SpO2 97 %   O2 Device None (Room air)       Current Vitals:   Vital Signs  BP: 114/78  Pulse: 84  Resp: 16  Temp: 98.4 °F (36.9 °C)  Temp src: Temporal    Oxygen Therapy  SpO2: 96 %  O2 Device: None (Room air)        Physical Exam  General: Awake and alert, no acute distress  : Erythema with generalized edema to left labia majora with central area of what appears to be purulent fluid collection below the skin that is fluctuant, very tender to palpation, no open wound or spontaneous drainage    ED Course   Labs Reviewed - No data to display     After informed consent and discussion of risks, benefits and alternatives, patient agreed to proceed with I&D.  The abscess was anesthetized with lidocaine with epinephrine. A vertical incision was performed with an 11 blade.  A large amount of purulent material was expressed.  Bleeding was easily controlled.         MDM      Patient presents with pain and swelling to the labia.  The patient has what appears to be a labial abscess.  There was good result of significant purulent fluid return on I&D.  The patient was counseled regarding appropriate wound care and follow-up.  I will place her on antibiotics as well.  She will be given gynecology follow-up.  All communications with patient were done with use of [employer] video  services (María Elena [id number].)  Patient expressed understanding and is comfortable with the plan.        MDM    Disposition and Plan     Clinical Impression:  1. Left genital labial abscess         Disposition:  Discharge  4/2/2025  9:40 am    Follow-up:  Kasandra Arauz MD  [facility]   [address] [address]  [phone]    Call today            Medications Prescribed:  Discharge Medication List as of 4/2/2025  9:45 AM        START taking these medications    Details   amoxicillin clavulanate 875-125 MG Oral Tab Take 1 tablet by mouth 2 (two) times  daily for 7 days., Normal, Disp-14 tablet, R-0                 Supplementary Documentation:

## 2025-04-02 NOTE — ED INITIAL ASSESSMENT (HPI)
Language line used for triage.  Pt states cyst on vagina.  Pt states had this x 1 year ago and did resolve.  Pt states yesterday this cyst got inflamed and painful again.  Pt denies fevers.

## 2025-04-03 ENCOUNTER — TELEPHONE (OUTPATIENT)
Dept: OBGYN CLINIC | Facility: CLINIC | Age: 65
End: 2025-04-03

## 2025-04-03 NOTE — TELEPHONE ENCOUNTER
Patient in [facility] 4/2/25 due to Left genital labial abscess. Calling to make follow up appt w/[facility]. Pt only speaks Portuguese. Pls call via cell phone #

## 2025-04-03 NOTE — TELEPHONE ENCOUNTER
4/2/2025- ED visit for left genital labial abscess   ABX started    Spoke to patient with use of  ID #[id number].  Patient currently not in pain and taking medication as completed.  Appt scheduled for 4/4/2025.

## 2025-04-04 ENCOUNTER — OFFICE VISIT (OUTPATIENT)
Dept: OBGYN CLINIC | Facility: CLINIC | Age: 65
End: 2025-04-04
Payer: COMMERCIAL

## 2025-04-04 VITALS
SYSTOLIC BLOOD PRESSURE: 134 MMHG | DIASTOLIC BLOOD PRESSURE: 80 MMHG | WEIGHT: 250 LBS | BODY MASS INDEX: 42.68 KG/M2 | HEIGHT: 64 IN | HEART RATE: 86 BPM

## 2025-04-04 DIAGNOSIS — N76.4 LEFT GENITAL LABIAL ABSCESS: Primary | ICD-10-CM

## 2025-04-04 PROCEDURE — 99203 OFFICE O/P NEW LOW 30 MIN: CPT | Performed by: NURSE PRACTITIONER

## 2025-04-04 NOTE — PROGRESS NOTES
Subjective:  64 year old    Chief Complaint   Patient presents with    ER F/U     Patient went to ER for vaginal abscess, was drained     LLS used for today's visit  Was seen in ED 2025 for labial abscess  I&D was performed and pt started on Augmentin  Pt has been taking augmentin as prescribed and tolerating well  Has noticed clear discharge  Denies fever, chills, body aches  Feels that the abscess is continuing to decrease in size  Pt also notes that pain has significantly improved    Review of Systems:  Pertinent items are noted in the HPI.    Objective:  /80   Pulse 86   Ht 64\"   Wt 250 lb (113.4 kg)       Physical Examination:  General appearance: Well dressed, well nourished in no apparent distress  Neurologic/Psychiatric: Alert and oriented to person, place and time, mood normal, affect appropriate  External genitalia- Slight generalized edema to left labia majora, no tenderness, no discharge    Assessment/Plan:      Diagnoses and all orders for this visit:    Left genital labial abscess  - ED notes reviewed  - Continue augmentin as prescribed  - keep area clean and dry  - to follow up with new/worsening symptoms or no improvement      Return if symptoms worsen or fail to improve.

## 2025-04-21 ENCOUNTER — HOSPITAL ENCOUNTER (EMERGENCY)
Facility: HOSPITAL | Age: 65
Discharge: HOME | End: 2025-04-21
Attending: EMERGENCY MEDICINE
Payer: COMMERCIAL

## 2025-04-21 VITALS
HEIGHT: 65 IN | HEART RATE: 89 BPM | RESPIRATION RATE: 16 BRPM | BODY MASS INDEX: 38.32 KG/M2 | DIASTOLIC BLOOD PRESSURE: 96 MMHG | SYSTOLIC BLOOD PRESSURE: 165 MMHG | WEIGHT: 230 LBS | TEMPERATURE: 97 F | OXYGEN SATURATION: 100 %

## 2025-04-21 DIAGNOSIS — N76.4 LABIAL ABSCESS: Primary | ICD-10-CM

## 2025-04-21 PROCEDURE — 96372 THER/PROPH/DIAG INJ SC/IM: CPT

## 2025-04-21 PROCEDURE — 99284 EMERGENCY DEPT VISIT MOD MDM: CPT

## 2025-04-21 PROCEDURE — 56405 I&D VULVA/PERINEAL ABSCESS: CPT

## 2025-04-21 RX ORDER — LIDOCAINE HYDROCHLORIDE 10 MG/ML
INJECTION, SOLUTION INFILTRATION; PERINEURAL
Status: COMPLETED
Start: 2025-04-21 | End: 2025-04-21

## 2025-04-21 RX ORDER — HYDROCODONE BITARTRATE AND ACETAMINOPHEN 5; 325 MG/1; MG/1
1-2 TABLET ORAL EVERY 6 HOURS PRN
Qty: 10 TABLET | Refills: 0 | Status: SHIPPED | OUTPATIENT
Start: 2025-04-21 | End: 2025-04-26

## 2025-04-21 RX ORDER — LIDOCAINE HYDROCHLORIDE 10 MG/ML
1 INJECTION, SOLUTION INFILTRATION; PERINEURAL ONCE
Status: COMPLETED | OUTPATIENT
Start: 2025-04-21 | End: 2025-04-21

## 2025-04-21 RX ORDER — KETOROLAC TROMETHAMINE 30 MG/ML
30 INJECTION, SOLUTION INTRAMUSCULAR; INTRAVENOUS ONCE
Status: COMPLETED | OUTPATIENT
Start: 2025-04-21 | End: 2025-04-21

## 2025-04-21 NOTE — ED INITIAL ASSESSMENT (HPI)
Pt presents to the ED with c/o possible labial abscess. Pt reports she was seen here earlier in the month and was dx with one on the opposite side. Pt reports taking tylenol just prior to coming to the ER. Pt awake and alert, skin w/d,resps reg/unlabored.

## 2025-04-21 NOTE — ED PROVIDER NOTES
Patient Seen in: [facility] Emergency Department    History     Chief Complaint   Patient presents with    Cyst     Stated Complaint: cyst to genitals    HPI  Patient complains of skin infection for  the last two  days.   Located to the right labia majora..  Describes as throbbing and intermittently sharp.  No fever or chills.    Past Medical History[1]    Past Surgical History[2]         Family History[3]    Short Social Hx on File[4]    Review of Systems    Positive for stated complaint: cyst to genitals  Other systems are as noted in HPI.  Constitutional and vital signs reviewed.      All other systems reviewed and negative except as noted above.    PSFH elements reviewed from today and agreed except as otherwise stated in HPI.    Physical Exam     ED Triage Vitals [04/21/25 0830]   /83   Pulse 105   Resp 16   Temp 97 °F (36.1 °C)   Temp src Temporal   SpO2 95 %   O2 Device None (Room air)       Current:BP (!) 165/96   Pulse 98   Temp 97 °F (36.1 °C) (Temporal)   Resp 16   Ht 165.1 cm (5' 5\")   Wt 104.3 kg   SpO2 100%   BMI 38.27 kg/m²       GENERAL: Patient is well-appearing, well-hydrated, well-nourished, nontoxic, there is no distress noted.  HEENT: EOMI, MMM no lesions  EXTREMITIES: Patient is moving all extremities at baseline  NEURO: alert, oriented x 3, 2-12 intact, no focal deficits appreciated  SKIN: There is a 5 cm area of edema, erythema with a small area of fluctuance.  There is a significant amount of tenderness.  There is no drainage at this time.  This abscess does not extend into the vaginal mucosa.  This is not a Bartholin abscess.  Patient also has multiple, cystic lesions noted bilaterally to the labia.  There is no surrounding erythema to those areas.  PSYCH: calm, cooperative,          ED Course   Labs Reviewed - No data to display  I have personally  reviewed available prior medical records for any recent pertinent discharge summaries/testing. Patient/family updated on  results and plan, a verbalized understanding and agreement with the plan.  I explained to the patient that emergent conditions may arise and to go to the ER for new, worsening or any persistent conditions. I've explained the importance of taking all medicatons as prescribed, follow up, and return precuations,  All questions answered.    Please note that this report has been produced using speech recognition software and may contain errors related to that system including, but not limited to, errors in grammar, punctuation, and spelling, as well as words and phrases that possibly may have been recognized inappropriately.  If there are any questions or concerns, contact the dictating provider for clarification.  MDM       Patient presents with abscess.     Differential diagnosis include:  abscess, cyst, cellulitis     Small amount of surrounding indurated tissue.  No fever, nontoxic, does not meet SIRS criteria.  After informed consent and injection of local anesthetic, I&D performed at bedside.  Small to moderate amount of purulent drainage was removed.  Packing was placed.  Patient counseled on local wound care.  Prescription given for antibiotics  and instructions given on use.  Patient given for Norco and instructions given on use.  Recommend close follow-up, wound check in 24 hours  Recommend follow-up with PCP.        Procedures:    Abscess drainage:  The patient abscess was located to the right labia.  Yes.   I obtained verbal consent from the patient to drain the abscess who was informed about the possibility of bleeding, pain and worsening of the condition.  The abscess was incised with a scalpel  and a small to moderate amount of purulent drainage was expressed.  I irrigated the wound The patient tolerated the procedure well. .  The procedure was performed by myself.  Chaperoned by patient's primary RN          Disposition and Plan     Clinical Impression:  1. Labial abscess         Disposition:  Discharge    Follow-up:  Kasandra Arauz MD  [address]   [facility] [address]  [phone]    Follow up        Medications Prescribed:  Current Discharge Medication List        START taking these medications    Details   HYDROcodone-acetaminophen 5-325 MG Oral Tab Take 1-2 tablets by mouth every 6 (six) hours as needed for Pain.  Qty: 10 tablet, Refills: 0    Associated Diagnoses: Labial abscess                            [1]   Past Medical History:   Diabetes (HCC)    Disorder of thyroid    Esophageal reflux    Hearing impaired person, bilateral    R EAR LOSS HEARING    High cholesterol    Hyperlipidemia   [2]   Past Surgical History:  Procedure Laterality Date    Cholecystectomy      Thyroidectomy      partial per pt- reports done ~. denies cancer hx   [3]   Family History  Problem Relation Age of Onset    Cancer Mother     Stroke Paternal Grandfather     Heart Disease Neg    [4]   Social History  Socioeconomic History    Marital status:    Tobacco Use    Smoking status: Never    Smokeless tobacco: Never    Tobacco comments:     Updated 25   Vaping Use    Vaping status: Never Used   Substance and Sexual Activity    Alcohol use: Never    Drug use: Never    Sexual activity: Not Currently   Other Topics Concern    Caffeine Concern No    Exercise Yes    Seat Belt No    Special Diet No    Stress Concern Yes    Weight Concern No     Social Drivers of Health     Food Insecurity: Not on File (2023)    Received from [employer]    Food Insecurity     Food: 0   Transportation Needs: Not on File (10/7/2022)    Received from [employer]    Transportation Needs     Transportation: 0   Housing Stability: Not on File (2023)    Received from [employer]    Housing Stability     Housin

## 2025-04-21 NOTE — DISCHARGE INSTRUCTIONS
- [place] todos los antibióticos hasta completarlos.    - [place] analgésicos según sea necesario.    - Debe controlar escalante nivel de azúcar en obey a diario.    - [place] de asiento según lo indicado.    - Llame y pida jenna con [employer].    - Regrese a urgencias si presenta cualquier empeoramiento de becky síntomas o inquietud.

## 2025-04-29 ENCOUNTER — OFFICE VISIT (OUTPATIENT)
Dept: OBGYN CLINIC | Facility: CLINIC | Age: 65
End: 2025-04-29
Payer: COMMERCIAL

## 2025-04-29 VITALS
BODY MASS INDEX: 42.71 KG/M2 | DIASTOLIC BLOOD PRESSURE: 84 MMHG | WEIGHT: 250.19 LBS | HEIGHT: 64 IN | SYSTOLIC BLOOD PRESSURE: 136 MMHG | HEART RATE: 90 BPM

## 2025-04-29 DIAGNOSIS — N95.2 VAGINAL ATROPHY: ICD-10-CM

## 2025-04-29 DIAGNOSIS — N76.4 LABIAL ABSCESS: Primary | ICD-10-CM

## 2025-04-29 PROCEDURE — 99213 OFFICE O/P EST LOW 20 MIN: CPT | Performed by: STUDENT IN AN ORGANIZED HEALTH CARE EDUCATION/TRAINING PROGRAM

## 2025-04-29 RX ORDER — SULFAMETHOXAZOLE AND TRIMETHOPRIM 800; 160 MG/1; MG/1
1 TABLET ORAL 2 TIMES DAILY
Qty: 20 TABLET | Refills: 0 | Status: SHIPPED | OUTPATIENT
Start: 2025-04-29 | End: 2025-05-09

## 2025-04-29 RX ORDER — ESTRADIOL 0.1 MG/G
CREAM VAGINAL
Qty: 42.5 G | Refills: 1 | Status: SHIPPED | OUTPATIENT
Start: 2025-04-29 | End: 2025-04-29

## 2025-04-29 RX ORDER — ESTRADIOL 0.1 MG/G
CREAM VAGINAL
Qty: 42.5 G | Refills: 5 | Status: SHIPPED | OUTPATIENT
Start: 2025-04-29

## 2025-04-29 NOTE — PROGRESS NOTES
GYN PROBLEM VISIT    Subjective:   This is a 64 year old  presenting for GYN visit. She had a left labial abscess s/p I&D 25. She was given augmentin at that time. This resolved. Then she developed another abscess, this time on the right and she is s/p I&D 25. She was given augmentin again. She has 2 more days of antibiotics left. She was feeling better except the area became irritated while riding in the car over the weekend. No fevers, chills.     Also reports vaginal dryness, itching which has been ongoing for a long time.       Review of Systems   Constitutional: Negative.    HENT: Negative.    Respiratory: Negative.    Gastrointestinal: Negative.    Endocrine: Negative.    Genitourinary: as above  Musculoskeletal: Negative.    Skin: Negative.    Allergic/Immunologic: Negative.    Neurological: Negative.      Past Medical History[1]    Past Surgical History[2]    Allergies[3]    Current Medications[4]      Objective:    Physical Exam     Vitals:    25 0819   BP: 136/84   Pulse: 90        Constitutional: She is oriented to person, place, and time. She appears well-developed and well-nourished.   Genitourinary: multiple sebacious cysts scattered throughout labia majora bilaterally. Right labial stab incision seen. +edema and induration without erythema or tenderness. No fluctuance, but area of edema is 2 x 3cm appx.       Assessment/Plan:  This is a 64 year old  presenting with:    #s/p labia abscess, with ongoing edema and induration  -switch to bactrim  -continue warm compresses    #vaginal atrophy  -vaginal estrogen    Smooth Kearney MD         [1]   Past Medical History:   Diabetes (HCC)    Disorder of thyroid    Esophageal reflux    Hearing impaired person, bilateral    R EAR LOSS HEARING    High cholesterol    Hyperlipidemia   [2]   Past Surgical History:  Procedure Laterality Date    Cholecystectomy      Thyroidectomy      partial per pt- reports done ~. denies cancer hx   [3] No  Known Allergies  [4]    sulfamethoxazole-trimethoprim DS (BACTRIM DS) 800-160 MG Oral Tab per tablet Take 1 tablet by mouth 2 (two) times daily for 10 days. 20 tablet 0    estradiol 0.1 MG/GM Vaginal Cream 1g nightly for 2 weeks, then 1 gram two nights a week 42.5 g 1    amoxicillin clavulanate 875-125 MG Oral Tab Take 1 tablet by mouth 2 (two) times daily for 10 days. 20 tablet 0    metFORMIN 500 MG Oral Tab Take 1 tablet (500 mg total) by mouth 2 (two) times daily with meals. 180 tablet 3    pantoprazole 40 MG Oral Tab EC Take 1 tablet (40 mg total) by mouth every morning before breakfast. 90 tablet 0

## 2025-05-22 ENCOUNTER — LAB ENCOUNTER (OUTPATIENT)
Dept: LAB | Age: 65
End: 2025-05-22
Attending: INTERNAL MEDICINE
Payer: COMMERCIAL

## 2025-05-22 ENCOUNTER — OFFICE VISIT (OUTPATIENT)
Dept: INTERNAL MEDICINE CLINIC | Facility: CLINIC | Age: 65
End: 2025-05-22
Payer: COMMERCIAL

## 2025-05-22 VITALS
SYSTOLIC BLOOD PRESSURE: 120 MMHG | TEMPERATURE: 97 F | OXYGEN SATURATION: 97 % | WEIGHT: 248.38 LBS | DIASTOLIC BLOOD PRESSURE: 74 MMHG | HEIGHT: 64 IN | HEART RATE: 101 BPM | BODY MASS INDEX: 42.4 KG/M2

## 2025-05-22 DIAGNOSIS — Z00.00 ANNUAL PHYSICAL EXAM: ICD-10-CM

## 2025-05-22 DIAGNOSIS — M25.561 CHRONIC PAIN OF RIGHT KNEE: ICD-10-CM

## 2025-05-22 DIAGNOSIS — K29.60 REFLUX GASTRITIS: ICD-10-CM

## 2025-05-22 DIAGNOSIS — G89.29 CHRONIC PAIN OF RIGHT KNEE: ICD-10-CM

## 2025-05-22 DIAGNOSIS — E55.9 VITAMIN D DEFICIENCY: ICD-10-CM

## 2025-05-22 DIAGNOSIS — Z12.11 SCREENING FOR COLON CANCER: ICD-10-CM

## 2025-05-22 DIAGNOSIS — Z12.31 VISIT FOR SCREENING MAMMOGRAM: ICD-10-CM

## 2025-05-22 DIAGNOSIS — E78.5 HYPERLIPIDEMIA, UNSPECIFIED HYPERLIPIDEMIA TYPE: ICD-10-CM

## 2025-05-22 DIAGNOSIS — E11.9 TYPE 2 DIABETES MELLITUS WITHOUT COMPLICATION, WITHOUT LONG-TERM CURRENT USE OF INSULIN (HCC): ICD-10-CM

## 2025-05-22 DIAGNOSIS — B35.1 ONYCHOMYCOSIS: ICD-10-CM

## 2025-05-22 DIAGNOSIS — Z00.00 ANNUAL PHYSICAL EXAM: Primary | ICD-10-CM

## 2025-05-22 LAB
ALBUMIN SERPL-MCNC: 4.9 G/DL (ref 3.2–4.8)
ALBUMIN/GLOB SERPL: 1.6 {RATIO} (ref 1–2)
ALP LIVER SERPL-CCNC: 81 U/L (ref 50–130)
ALT SERPL-CCNC: 43 U/L (ref 10–49)
ANION GAP SERPL CALC-SCNC: 9 MMOL/L (ref 0–18)
AST SERPL-CCNC: 35 U/L (ref ?–34)
BASOPHILS # BLD AUTO: 0.07 X10(3) UL (ref 0–0.2)
BASOPHILS NFR BLD AUTO: 0.9 %
BILIRUB SERPL-MCNC: 0.4 MG/DL (ref 0.2–1.1)
BUN BLD-MCNC: 12 MG/DL (ref 9–23)
CALCIUM BLD-MCNC: 9.5 MG/DL (ref 8.7–10.6)
CHLORIDE SERPL-SCNC: 107 MMOL/L (ref 98–112)
CHOLEST SERPL-MCNC: 195 MG/DL (ref ?–200)
CO2 SERPL-SCNC: 26 MMOL/L (ref 21–32)
CREAT BLD-MCNC: 0.91 MG/DL (ref 0.55–1.02)
CREAT UR-SCNC: 138.1 MG/DL
EGFRCR SERPLBLD CKD-EPI 2021: 71 ML/MIN/1.73M2 (ref 60–?)
EOSINOPHIL # BLD AUTO: 0.08 X10(3) UL (ref 0–0.7)
EOSINOPHIL NFR BLD AUTO: 1.1 %
ERYTHROCYTE [DISTWIDTH] IN BLOOD BY AUTOMATED COUNT: 14 %
EST. AVERAGE GLUCOSE BLD GHB EST-MCNC: 148 MG/DL (ref 68–126)
FASTING PATIENT LIPID ANSWER: NO
FASTING STATUS PATIENT QL REPORTED: NO
GLOBULIN PLAS-MCNC: 3 G/DL (ref 2–3.5)
GLUCOSE BLD-MCNC: 121 MG/DL (ref 70–99)
HBA1C MFR BLD: 6.8 % (ref ?–5.7)
HCT VFR BLD AUTO: 44.7 % (ref 35–48)
HDLC SERPL-MCNC: 40 MG/DL (ref 40–59)
HGB BLD-MCNC: 14.6 G/DL (ref 12–16)
IMM GRANULOCYTES # BLD AUTO: 0.02 X10(3) UL (ref 0–1)
IMM GRANULOCYTES NFR BLD: 0.3 %
LDLC SERPL CALC-MCNC: 120 MG/DL (ref ?–100)
LYMPHOCYTES # BLD AUTO: 2.75 X10(3) UL (ref 1–4)
LYMPHOCYTES NFR BLD AUTO: 37.1 %
MCH RBC QN AUTO: 29.9 PG (ref 26–34)
MCHC RBC AUTO-ENTMCNC: 32.7 G/DL (ref 31–37)
MCV RBC AUTO: 91.6 FL (ref 80–100)
MICROALBUMIN UR-MCNC: 0.5 MG/DL
MICROALBUMIN/CREAT 24H UR-RTO: 3.6 UG/MG (ref ?–30)
MONOCYTES # BLD AUTO: 0.42 X10(3) UL (ref 0.1–1)
MONOCYTES NFR BLD AUTO: 5.7 %
NEUTROPHILS # BLD AUTO: 4.08 X10 (3) UL (ref 1.5–7.7)
NEUTROPHILS # BLD AUTO: 4.08 X10(3) UL (ref 1.5–7.7)
NEUTROPHILS NFR BLD AUTO: 54.9 %
NONHDLC SERPL-MCNC: 155 MG/DL (ref ?–130)
OSMOLALITY SERPL CALC.SUM OF ELEC: 295 MOSM/KG (ref 275–295)
PLATELET # BLD AUTO: 229 10(3)UL (ref 150–450)
POTASSIUM SERPL-SCNC: 4.2 MMOL/L (ref 3.5–5.1)
PROT SERPL-MCNC: 7.9 G/DL (ref 5.7–8.2)
RBC # BLD AUTO: 4.88 X10(6)UL (ref 3.8–5.3)
SODIUM SERPL-SCNC: 142 MMOL/L (ref 136–145)
TRIGL SERPL-MCNC: 200 MG/DL (ref 30–149)
TSI SER-ACNC: 1.95 UIU/ML (ref 0.55–4.78)
VIT D+METAB SERPL-MCNC: 18 NG/ML (ref 30–100)
VLDLC SERPL CALC-MCNC: 35 MG/DL (ref 0–30)
WBC # BLD AUTO: 7.4 X10(3) UL (ref 4–11)

## 2025-05-22 PROCEDURE — 82306 VITAMIN D 25 HYDROXY: CPT

## 2025-05-22 PROCEDURE — 85025 COMPLETE CBC W/AUTO DIFF WBC: CPT

## 2025-05-22 PROCEDURE — 84443 ASSAY THYROID STIM HORMONE: CPT

## 2025-05-22 PROCEDURE — 82043 UR ALBUMIN QUANTITATIVE: CPT

## 2025-05-22 PROCEDURE — 36415 COLL VENOUS BLD VENIPUNCTURE: CPT

## 2025-05-22 PROCEDURE — 80061 LIPID PANEL: CPT

## 2025-05-22 PROCEDURE — 80053 COMPREHEN METABOLIC PANEL: CPT

## 2025-05-22 PROCEDURE — 83036 HEMOGLOBIN GLYCOSYLATED A1C: CPT

## 2025-05-22 PROCEDURE — 82570 ASSAY OF URINE CREATININE: CPT

## 2025-05-22 RX ORDER — ERGOCALCIFEROL 1.25 MG/1
50000 CAPSULE, LIQUID FILLED ORAL WEEKLY
Qty: 12 CAPSULE | Refills: 0 | Status: SHIPPED | OUTPATIENT
Start: 2025-05-22 | End: 2025-08-08

## 2025-05-22 RX ORDER — ESTRADIOL 0.1 MG/G
CREAM VAGINAL
Qty: 42.5 G | Refills: 5 | Status: CANCELLED | OUTPATIENT
Start: 2025-05-22

## 2025-05-22 RX ORDER — PANTOPRAZOLE SODIUM 40 MG/1
40 TABLET, DELAYED RELEASE ORAL
Qty: 90 TABLET | Refills: 0 | Status: SHIPPED | OUTPATIENT
Start: 2025-05-22

## 2025-05-22 NOTE — PROGRESS NOTES
Subjective:   Rubi Lee is a 64 year old female  who presents for Physical (Reviewed Preventative/Wellness form with patient. )       The following individual(s) verbally consented to be recorded using ambient AI listening technology and understand that they can each withdraw their consent to this listening technology at any point by asking the clinician to turn off or pause the recording:    Patient name: Rubi Lee          History of Present Illness      She has recurrent vaginal abscesses, with one previously excised and another developing on the opposite side. Treatment included excision, antibiotics resulting in improvement. She reports significant pain during procedures,  but currently feels better.    She experiences persistent right knee pain. Previous treatment with a gel was delayed due to insurance issues, but she now has new insurance and plans to follow up with a specialist.    She manages her diabetes with metformin and hyperlipidemia with atorvastatin.     She is dealing with toenail fungus, using home remedies with some improvement, and is open to further specialist care.    No rectal bleeding, nausea, vomiting, stomach pain, changes in breasts or nipples, chest pain, shortness of breath, or respiratory issues are present.     History/Other:    Chief Complaint Reviewed and Verified  Nursing Notes Reviewed and   Verified  Tobacco Reviewed  Allergies Reviewed  Medications Reviewed    Problem List Reviewed  Medical History Reviewed  Surgical History   Reviewed  OB Status Reviewed  Family History Reviewed  Social History   Reviewed         Current Medications[1]    Review of Systems:  Pertinent items are noted in HPI.  A comprehensive 10 point review of systems was completed.  Pertinent positives and negatives noted in the the HPI.        Objective:   /74 (BP Location: Left arm, Patient Position: Sitting, Cuff Size: large)   Pulse 101   Temp 97 °F (36.1 °C) (Temporal)    Ht 5' 4\" (1.626 m)   Wt 248 lb 6.4 oz (112.7 kg)   SpO2 97%   BMI 42.64 kg/m²  Estimated body mass index is 42.64 kg/m² as calculated from the following:    Height as of this encounter: 5' 4\" (1.626 m).    Weight as of this encounter: 248 lb 6.4 oz (112.7 kg).  PHYSICAL EXAM:   General: no acute distress   Eyes: pupils equal and reactive; EOMI; sclera normal; conjunctiva normal   ENT without erythema or exudate  Neck: trachea midline; no adenopathy; thyroid not enlarged   Hematologic/lymphatic:no cervical lymphadenopathy; no supraclavicular adenopathy   Respiratory: no increased work of breathing; good air exchange; CTAB; no crackles or wheezing   Cardiovascular: RRR; S1, S2; no murmurs; no carotid bruits; no edema   Gastrointestinal: normal bowel sounds; soft; non-distended; non-tender  Neurological: awake, alert, oriented x3; CNII-XII grossly intact;  MSK: R knee pain with flexion   Behavioral/Psych: euthymic; appropriate affect   Breasts: symmetrical; no masses or nipple discharge or rashes/lesions noted   Bilateral barefoot skin diabetic exam visualized feet. Onychomycosis   Bilateral monofilament/sensation of both feet is normal.  Pulsation pedal pulse exam of both lower legs/feet is normal as well.      Physical Exam      Assessment & Plan:       Assessment & Plan  Type 2 diabetes mellitus without complications  Diabetes management is ongoing.  - Order blood tests to assess diabetes status.  - Continue Metformin     Hyperlipidemia management is ongoing.  - Order blood tests to assess cholesterol levels.  - Continue Atorvastatin     Osteoarthritis of right knee  Chronic pain due to osteoarthritis. New insurance may allow treatment.  - Refer to orthopedist for right knee evaluation and potential treatment.    Reflux gastritis  Managed with antacids as needed.  - Continue using antacids as needed for reflux symptoms.    Onychomycosis- podiatry referral     This note was created utilizing [employer] speech recognition  software which may lead to grammatical errors/typos.   If any word or phrase is confusing, it is likely due to recognition error. Please ask the provider for clarification.      Amber Stroud MD       [1]   Current Outpatient Medications   Medication Sig Dispense Refill    estradiol 0.1 MG/GM Vaginal Cream 1g nightly for 2 weeks, then 1 gram two nights a week 42.5 g 5    metFORMIN 500 MG Oral Tab Take 1 tablet (500 mg total) by mouth 2 (two) times daily with meals. 180 tablet 3    FLUTICASONE PROPIONATE 50 MCG/ACT Nasal Suspension USE 2 SPRAY(S) IN EACH NOSTRIL ONCE DAILY AS NEEDED FOR RUNNY NOSE 16 g 0    pantoprazole 40 MG Oral Tab EC Take 1 tablet (40 mg total) by mouth every morning before breakfast. 90 tablet 0    atorvastatin 20 MG Oral Tab Take 1 tablet (20 mg total) by mouth nightly.

## 2025-05-28 ENCOUNTER — LAB ENCOUNTER (OUTPATIENT)
Dept: LAB | Age: 65
End: 2025-05-28
Attending: INTERNAL MEDICINE
Payer: COMMERCIAL

## 2025-05-28 DIAGNOSIS — Z12.11 SCREENING FOR COLON CANCER: ICD-10-CM

## 2025-05-28 DIAGNOSIS — Z00.00 ANNUAL PHYSICAL EXAM: ICD-10-CM

## 2025-05-28 LAB — HEMOCCULT STL QL: NEGATIVE

## 2025-05-28 PROCEDURE — 82274 ASSAY TEST FOR BLOOD FECAL: CPT

## 2025-06-11 ENCOUNTER — HOSPITAL ENCOUNTER (OUTPATIENT)
Age: 65
Discharge: HOME | End: 2025-06-11
Payer: COMMERCIAL

## 2025-06-11 VITALS
HEART RATE: 98 BPM | TEMPERATURE: 99 F | SYSTOLIC BLOOD PRESSURE: 161 MMHG | RESPIRATION RATE: 18 BRPM | OXYGEN SATURATION: 97 % | WEIGHT: 250 LBS | DIASTOLIC BLOOD PRESSURE: 97 MMHG | BODY MASS INDEX: 43 KG/M2

## 2025-06-11 DIAGNOSIS — N76.4 LABIAL ABSCESS: Primary | ICD-10-CM

## 2025-06-11 PROCEDURE — 56405 I&D VULVA/PERINEAL ABSCESS: CPT

## 2025-06-11 PROCEDURE — 99213 OFFICE O/P EST LOW 20 MIN: CPT

## 2025-06-11 PROCEDURE — 99214 OFFICE O/P EST MOD 30 MIN: CPT

## 2025-06-11 RX ORDER — SULFAMETHOXAZOLE AND TRIMETHOPRIM 800; 160 MG/1; MG/1
1 TABLET ORAL 2 TIMES DAILY
Qty: 20 TABLET | Refills: 0 | Status: SHIPPED | OUTPATIENT
Start: 2025-06-11 | End: 2025-06-21

## 2025-06-11 NOTE — ED INITIAL ASSESSMENT (HPI)
Patient states last night- patient states lump to her private parts  Denies any urinary frequency and frequency.

## 2025-06-12 NOTE — ED PROVIDER NOTES
Patient Seen in: [facility]        History  Chief Complaint   Patient presents with    Eval-G     Stated Complaint: eval g    Subjective:   HPI  65 yo with dm, gerd, hld presents with R labia pain and swelling. She has had multiple labial abscesses drained and followed up with gyne who said she had cysts with no definitive treatment.      Objective:     Past Medical History:    Diabetes (HCC)    Disorder of thyroid    Esophageal reflux    Hearing impaired person, bilateral    R EAR LOSS HEARING    High cholesterol    Hyperlipidemia              Past Surgical History:   Procedure Laterality Date    Cholecystectomy      Thyroidectomy      partial per pt- reports done ~. denies cancer hx                Social History     Socioeconomic History    Marital status:    Tobacco Use    Smoking status: Never    Smokeless tobacco: Never    Tobacco comments:     Updated 25   Vaping Use    Vaping status: Never Used   Substance and Sexual Activity    Alcohol use: Never    Drug use: Never    Sexual activity: Not Currently   Other Topics Concern    Caffeine Concern No    Exercise Yes    Seat Belt No    Special Diet No    Stress Concern Yes    Weight Concern No     Social Drivers of Health     Food Insecurity: Not on File (2023)    Received from [employer]    Food Insecurity     Food: 0   Transportation Needs: Not on File (10/7/2022)    Received from [employer]    Transportation Needs     Transportation: 0   Housing Stability: Not on File (2023)    Received from [employer]    Housing Stability     Housin              Review of Systems   All other systems reviewed and are negative.      Positive for stated complaint: eval g  Other systems are as noted in HPI.  Constitutional and vital signs reviewed.      All other systems reviewed and negative except as noted above.                  Physical Exam    ED Triage Vitals [25 1847]   BP (!) 161/97   Pulse 98   Resp 18   Temp 98.7 °F (37.1 °C)   Temp  src Oral   SpO2 97 %   O2 Device None (Room air)       Current Vitals:   Vital Signs  BP: (!) 161/97  Pulse: 98  Resp: 18  Temp: 98.7 °F (37.1 °C)  Temp src: Oral    Oxygen Therapy  SpO2: 97 %  O2 Device: None (Room air)            Physical Exam  Vitals and nursing note reviewed. Exam conducted with a chaperone present (Peggy RN).   Constitutional:       General: She is not in acute distress.     Appearance: She is well-developed. She is not ill-appearing or toxic-appearing.   Cardiovascular:      Rate and Rhythm: Normal rate.   Pulmonary:      Effort: Pulmonary effort is normal.   Genitourinary:     Comments: R external labial induration with fluctuance.  Skin:     General: Skin is warm and dry.   Neurological:      Mental Status: She is alert and oriented to person, place, and time.                 ED Course  Labs Reviewed - No data to display       Procedure: Incision and drainage  Procedure note:   Verbal consent obtained from patient/parent  Area was prepped sterile  1% Lidocaine injected into the abscess for local anesthesia  After attaining significant anesthesia, using a #11 blade, an incision was made into the most fluctuant portion of the abscess.   2 ml of purulent drainage was noted. Abscess was evacuated further with expression                        Medical Decision Making    63 yo with labial abscess.  Differential diagnosis: abscess, bartholin's, cellulitis  I and d performed and pt started on Bactrim with follow up with gyne for vaginal abscess.     Disposition and Plan     Clinical Impression:  1. Labial abscess         Disposition:  Discharge  6/11/2025  7:18 pm    Follow-up:  Smooth Kearney MD  [address] [place] [address]  [phone]    Call             Medications Prescribed:  Discharge Medication List as of 6/11/2025  7:22 PM                Supplementary Documentation:

## 2025-06-25 ENCOUNTER — TELEPHONE (OUTPATIENT)
Dept: INTERNAL MEDICINE CLINIC | Facility: CLINIC | Age: 65
End: 2025-06-25

## 2025-07-21 ENCOUNTER — OFFICE VISIT (OUTPATIENT)
Dept: OBGYN CLINIC | Facility: CLINIC | Age: 65
End: 2025-07-21
Payer: COMMERCIAL

## 2025-07-21 VITALS
DIASTOLIC BLOOD PRESSURE: 82 MMHG | WEIGHT: 249.25 LBS | BODY MASS INDEX: 42.55 KG/M2 | HEART RATE: 85 BPM | HEIGHT: 64 IN | SYSTOLIC BLOOD PRESSURE: 128 MMHG

## 2025-07-21 DIAGNOSIS — Z01.419 WELL WOMAN EXAM WITH ROUTINE GYNECOLOGICAL EXAM: ICD-10-CM

## 2025-07-21 DIAGNOSIS — L30.9 VULVAR DERMATITIS: Primary | ICD-10-CM

## 2025-07-21 NOTE — PROGRESS NOTES
Annual Exam (Ages 50-64)    Subjective:    This is a 64 year old  presenting for routine annual exam    Is postmenopausal. No PMB    Continues to reports current labial abscesses requiring I&D.     Pap Date: 24  Pap Result Notes: neg/neg       Review of Systems   Constitutional: Negative.    HENT: Negative.    Respiratory: Negative.    Gastrointestinal: Negative.    Endocrine: Negative.    Genitourinary: Negative.    Musculoskeletal: Negative.    Skin: Negative.    Allergic/Immunologic: Negative.    Neurological: Negative.      Objective:    Allergies[1]  Past Medical History[2]  Medications - Current[3]  Past Surgical History[4]  Family History[5]   reports that she has never smoked. She has never used smokeless tobacco. She reports that she does not drink alcohol and does not use drugs.    Physical Exam     Vitals:    25 1147   BP: 128/82   Pulse: 85        Constitutional: She is oriented to person, place, and time. She appears well-developed and well-nourished.   Cardiovascular: Normal peripheral perfusion  Breasts: Examined sitting and supine. No cervical, supraclavicular or axillary adenopathy, no masses, skin changes or nipple discharge.  Pulmonary/Chest: Non labored breathing  Abdominal: Soft. There is no tenderness.   Genitourinary: Bilateral labial with many sebaceous cysts in clusters Vagina is well estrogenized. Normal appearing urethral meatus. Bartholin's gland normal to palpation. Normal appearing  cervix. Cervix is not friable and with normal appearing discharge. Uterus is 6 weeks size  and non tender. No cervical motion tenderness. Normal adnexa bilaterally without tenderness.  Neurological: She is alert and oriented to person, place, and time.     Assessment and Plan    This is a 64 year old  presenting for annual exam.     #Annual Exam  -Depression screening   Depression Screening (PHQ-2/PHQ-9): Over the LAST 2 WEEKS   Little interest or pleasure in doing things (over the last  two weeks)?: Not at all    Feeling down, depressed, or hopeless (over the last two weeks)?: Not at all    PHQ-2 SCORE: 0      -Blood pressure screening ok  -Contraception menopause  -Body mass index is 42.78 kg/m².   -Pap smear UTD  -Mammogram already scheduled  -info given on [facility] dermatologists    Smooth Kearney MD           [1] No Known Allergies  [2]   Past Medical History:   Diabetes (HCC)    Disorder of thyroid    Esophageal reflux    Hearing impaired person, bilateral    R EAR LOSS HEARING    High cholesterol    Hyperlipidemia   [3]   Current Outpatient Medications:     pantoprazole 40 MG Oral Tab EC, Take 1 tablet (40 mg total) by mouth every morning before breakfast., Disp: 90 tablet, Rfl: 0    metFORMIN 500 MG Oral Tab, Take 1 tablet (500 mg total) by mouth 2 (two) times daily with meals., Disp: 180 tablet, Rfl: 0    ergocalciferol 1.25 MG (48752 UT) Oral Cap, Take 1 capsule (50,000 Units total) by mouth once a week for 12 doses., Disp: 12 capsule, Rfl: 0    estradiol 0.1 MG/GM Vaginal Cream, 1g nightly for 2 weeks, then 1 gram two nights a week, Disp: 42.5 g, Rfl: 5    FLUTICASONE PROPIONATE 50 MCG/ACT Nasal Suspension, USE 2 SPRAY(S) IN EACH NOSTRIL ONCE DAILY AS NEEDED FOR RUNNY NOSE (Patient not taking: Reported on 6/11/2025), Disp: 16 g, Rfl: 0    atorvastatin 20 MG Oral Tab, Take 1 tablet (20 mg total) by mouth nightly. (Patient not taking: Reported on 6/11/2025), Disp: , Rfl:   [4]   Past Surgical History:  Procedure Laterality Date    Cholecystectomy      Thyroidectomy      partial per pt- reports done ~2000. denies cancer hx   [5]   Family History  Problem Relation Age of Onset    Cancer Mother     Stroke Paternal Grandfather     Heart Disease Neg

## 2025-07-21 NOTE — PATIENT INSTRUCTIONS
[facility]:    Anny Estrada MD  tel:[phone]  [facility]      MD Jordin Eduardo  [address] [place] [address].  [address]  [place], [place] [address] [phone]

## 2025-08-05 ENCOUNTER — HOSPITAL ENCOUNTER (OUTPATIENT)
Dept: MAMMOGRAPHY | Age: 65
Discharge: HOME | End: 2025-08-05
Attending: INTERNAL MEDICINE

## 2025-08-05 DIAGNOSIS — Z12.31 VISIT FOR SCREENING MAMMOGRAM: ICD-10-CM

## 2025-08-05 PROCEDURE — 77067 SCR MAMMO BI INCL CAD: CPT | Performed by: INTERNAL MEDICINE

## 2025-08-05 PROCEDURE — 77063 BREAST TOMOSYNTHESIS BI: CPT | Performed by: INTERNAL MEDICINE

## (undated) DEVICE — NEEDLE SPINAL 22X3 BLACK

## (undated) DEVICE — MEDI-VAC NON-CONDUCTIVE SUCTION TUBING: Brand: CARDINAL HEALTH

## (undated) DEVICE — HYSTEROSCOPIC OUTFLOW TUBE SET

## (undated) DEVICE — PACK GYNE CUSTOM

## (undated) DEVICE — SOLUTION IRRIG 1000ML 0.9% NACL USP BTL

## (undated) DEVICE — 2000CC GUARDIAN II: Brand: GUARDIAN

## (undated) DEVICE — SPECIMEN SOCK - STANDARD: Brand: MEDI-VAC

## (undated) DEVICE — PREMIUM WET SKIN PREP TRAY: Brand: MEDLINE INDUSTRIES, INC.

## (undated) DEVICE — SLEEVE COMPR MD KNEE LEN SGL USE KENDALL SCD

## (undated) DEVICE — SOLUTION IRRIG 3000ML 0.9% NACL FLX CONT

## (undated) DEVICE — SET TB INFLO FOR TRUCLEAR SYS HYSTEROLUX

## (undated) DEVICE — ELITE HYSTEROSCOPE SEAL: Brand: TRUCLEAR

## (undated) DEVICE — SOFT TISSUE SHAVER MINI: Brand: TRUCLEAR

## (undated) DEVICE — GLOVE SUR 7 SENSICARE PI PIP CRM PWD F

## (undated) NOTE — LETTER
6/25/2025    [place] [address]         Estimado/a Rubi,    Le enviamos esta carta para informarle que nuestra oficina choe intentado ponerse en contacto con usted por teléfono sin éxito. El motivo de la llamada era para informarle sobre los resultados de los análisis.   Por favor, comuníquese con nuestra oficina llamando al número ubicado debajo para hablar sobre tato maynor y para realizar los cambios necesarios a escalante información de contacto en nuestro sistema. Diana por escalante ayuda.    Sinceramente,    Amber Stroud MD  [address]  [facility] [address]  Ph: [phone]  Fax: [phone]         Document electronically generated by:  Aura ALEJANDRO RN

## (undated) NOTE — LETTER
[facility]     [x] NEW APPLICANT  [address] [place] [id number]  [] RENEWAL            Persons with Disabilities Certification for Parking Placard  *This form is valid for three months from your physician's signature date for a Temporary Placard and six months for a Permanent Placard.    NOTE TO ALL DISABILITY LICENSE PLATE OWNERS:  If you have a disability license plate, you MUST complete the form and renew your placard.    DIRECTIONS: Both sides of this document must be signed and completed fully. All fields are required.   Applicants complete Part 1. If the applicant is a MINOR, then Parent/Guardian(s) MUST also complete Part 2. The applicant's medical professional MUSTcomplete Part 3. If the applicant is applying for meter-exempt parking, his/her medical professional MUST also complete Part 4.    PART 1:   Applicant Information (MUST have a valid [place] 's license and/or ID card)  I hereby certify  that I meet the definition of a person with a disability as provided in [facility] 5/1-159.1, and I certify  that my physical condition entitles me to the issuance of a Persons with Disabilities Parking Placard. By affixing my signature below, I understand that the parking placard may not be used unless I am the  or passenger of the vehicle.     *If a  , please provide a copy of your  showing proof of service.     Disability Parking Placard # (if any)     Full Name of Person with Disability (If minor, complete Part 2 also)    Rubi Lee  Male/Female  female  Date of Birth   12/21/1960    Valid [place] ’s License or ID Card # of Applicant                                                                                                  [place] Address  [address] [place] [address]    Mailing Address if Different from Above   Telephone Number  [phone] (home)  Email Address   No e-mail address on record  [place]?  Yes/No  No     Signature of Person with Disability Today’s Date  6/8/2024     PART 2:   For Parent or Legal Guardian (MUST have a valid 's license and/or ID card)  I hereby certify that the above applicant is a minor and I have primary responsibility for his/her transportation. By affixing my signature below, I understand that the disability placard is issued to the person with the disability and may not be used unless I am transporting the disabled person in the vehicle.     Name of Parent or Legal Guardian   Relationship to Person with Disability   Valid [place] ’[place] License or ID Card #          [place] Address Apt/Unit# City State Zip   Telephone Number Email Address   Signature of Parent or Legal Guardian Today’s Date  6/8/2024     Warning: Any misuse of the disability parking placard/plates or making a false application may result in the revocation of the placard, a 12-month suspension or revocation of your drivers license, and a fine of up to $1,000.    Temporary Disabled Parking Placard Applications -- May be taken to any [facility] facility or mailed in.  Permanent Disables Parking Placard Applications -- MUST be mailed to the following address:  , Persons with Disabilities Placard Unit, [address] [place] [address], [address], [place] [address].    *If you have a permanent disability placard and would like a Persons with Disabilities License Plate, please visit your local  facility to apply. You will need your permanent placard number and current plate number or RANDI.     Please complete Page 2 to ensure timely processing.    Printed by authority of the [facility]. July 2021 -- 1 -- VSD 62.28          Part 3: Medical Eligibility Standards and Medical Professionals Certification  As the medical professional(s) executing this document and verifying the nature of the applicant's disability, I understand that making a false representation of a person's  disability for the purposes of obtaining any type of a disabled parking placard may result in suspension or revocation of my license and a fine of up to $1,000. As a licensed physician, advanced practice nurse, optometrist, chiropractor or physician's assistant, I certify the applicant has a condition that constitutes him/her as a person with disabilities.   Length of Disability: (Check one)   [x]   Temporary Disability; the duration of this disability is ___6 months________________ (maximum 6 months)  []   Permanent Disability  []   Meter-Exempt Disability (Must complete and sign Part 4 also.)  Check all that apply (MUST check at least one):  []  Is restricted by a lung disease to such a degree that the person’s forced (respiratory) expiratory volume (FEV) for 1 second, when measured by spirometry, is less than 1 liter.  []   Uses a portable oxygen device.  []   Has a Class III or Class IV cardiac condition according to the standards set by the American Heart Association.  []   Cannot walk without the use of or assistance from a wheelchair, a walker, a crutch, a brace, a prosthetic device or another person.  []   Is severely limited in the ability to walk due to an arthritic, neurological, oncological or orthopedic condition.  []   Cannot walk 200 feet without stopping to rest because of one of the above five conditions.  Check all that apply: (MUST check at least one diagnosis):  []Amputation of extremity(s)_________________ [] Arthritis of the ___knees (severe)_________________  []Spina Bifida     []Osteoarthritis of the __________________   []Multiple Sclerosis    [] Chronic Pain due to ___________________  []Quadriplegia/Paraplegia   [] Legally Blind with limited mobility  [] Cerebral Palsy  [] Other Diagnosis: _________________________________________________________________    If none of the above conditions apply, list the medical condition that impacts the person’s mobility.     Medical Professional’s  Printed Name*   Amber Stroud MD Specialty internal medicine      Office Address   [facility], [address]  [facility] [address]  Dept: [phone]  Dept Fax: [phone]   Medical Professional’s Signature   State Professional License Number (NOT NPI#)  [id number] Today’s Date                  6/8/2024    Signature of Collaborating/Supervising Physician (if signed above by resident/assistant) Supervising State Professional License Number             PART 4: Medical Eligibility for Meter-Exempt Parking  The meter-exempt parking certification must be completed only when the applicant qualifies. To qualify, the applicant MUST have a VALID  [place] 's license, have an ambulatory disability described in Part 3, and also have one of the following conditions listed below.  Economic need is not a consideration for meter-exempt parking    The applicant is eligible for meter-exempt parking as provided by statue due to the following PERMANENT medical condition or disability:    Check all that apply:  [] Cannot manage, manipulate, or insert coins, or obtain tickets in parking meters/ticket machines due to lack of fine motor control of BOTH hands.  [] Cannot reach above his/her head to a height of 42 inches from the ground due to a lack of finger, hand or upper-extremity strength or mobility.  [] Cannot approach a parking meter due to his/her use of a wheelchair or other device for mobility.  [] Cannot walk more than 20 feet due to an orthopedic, neurological, cardiovascular, or lung condition in which the degree of debilitation is so severe that it almost completely impedes the ability to walk.  [] Missing a hand(s) or arm(s) or has permanently lost the use of a hand or arm.  [] Patient is under 18 years of age and incapable of driving.     Medical Professional’s Signature State Professional License Number (NOT NPI#)   Today’s Date                   6/8/2024    Signature of Collaborating/Supervising Physician (if signed above by resident/assistant) Supervising State Professional License Number     FOR  OFFICE USE ONLY     Parking Placard Number:  Expiration Date:   Issued By: Issued Date:

## (undated) NOTE — LETTER
[facility]     [] NEW APPLICANT  [address] [place] [address]  [] RENEWAL            Persons with Disabilities Certification for Parking Placard  *This form is valid for three months from your physician's signature date for a Temporary Placard and six months for a Permanent Placard.    NOTE TO ALL DISABILITY LICENSE PLATE OWNERS:  If you have a disability license plate, you MUST complete the form and renew your placard.    DIRECTIONS: Both sides of this document must be signed and completed fully. All fields are required.   Applicants complete Part 1. If the applicant is a MINOR, then Parent/Guardian(s) MUST also complete Part 2. The applicant's medical professional MUSTcomplete Part 3. If the applicant is applying for meter-exempt parking, his/her medical professional MUST also complete Part 4.    PART 1:   Applicant Information (MUST have a valid [place] 's license and/or ID card)  I hereby certify  that I meet the definition of a person with a disability as provided in [facility] 5/1-159.1, and I certify  that my physical condition entitles me to the issuance of a Persons with Disabilities Parking Placard. By affixing my signature below, I understand that the parking placard may not be used unless I am the  or passenger of the vehicle.     *If a  , please provide a copy of your  showing proof of service.     Disability Parking Placard # (if any)     Full Name of Person with Disability (If minor, complete Part 2 also)    Rubi Lee  Male/Female  female  Date of Birth   12/21/1960    Valid [place] ’s License or ID Card # of Applicant                                                                                                  [place] Address  [address] [place] [address]    Mailing Address if Different from Above   Telephone Number  [phone] (home)  Email Address   No e-mail address on record  ?  Yes/No  No     Signature of Person with Disability Today’s Date  5/22/2025     PART 2:   For Parent or Legal Guardian (MUST have a valid 's license and/or ID card)  I hereby certify that the above applicant is a minor and I have primary responsibility for his/her transportation. By affixing my signature below, I understand that the disability placard is issued to the person with the disability and may not be used unless I am transporting the disabled person in the vehicle.     Name of Parent or Legal Guardian   Relationship to Person with Disability   Valid [place] ’s License or ID Card #          [place] Address Apt/Unit# City State Zip   Telephone Number Email Address   Signature of Parent or Legal Guardian Today’s Date  5/22/2025     Warning: Any misuse of the disability parking placard/plates or making a false application may result in the revocation of the placard, a 12-month suspension or revocation of your drivers license, and a fine of up to $1,000.    Temporary Disabled Parking Placard Applications -- May be taken to any [facility] facility or mailed in.  Permanent Disables Parking Placard Applications -- MUST be mailed to the following address:  , Persons with Disabilities Placard Unit, [address] [place] [address], [address], [place] [address].    *If you have a permanent disability placard and would like a Persons with Disabilities License Plate, please visit your local  facility to apply. You will need your permanent placard number and current plate number or RANDI.     Please complete Page 2 to ensure timely processing.    Printed by authority of the [facility]. July 2021 -- 1 -- VSD 62.28          Part 3: Medical Eligibility Standards and Medical Professionals Certification  As the medical professional(s) executing this document and verifying the nature of the applicant's disability, I understand that making a false representation of a person's  disability for the purposes of obtaining any type of a disabled parking placard may result in suspension or revocation of my license and a fine of up to $1,000. As a licensed physician, advanced practice nurse, optometrist, chiropractor or physician's assistant, I certify the applicant has a condition that constitutes him/her as a person with disabilities.   Length of Disability: (Check one)   [x]   Temporary Disability; the duration of this disability is _____6 months_______ (maximum 6 months)  []   Permanent Disability  []   Meter-Exempt Disability (Must complete and sign Part 4 also.)  Check all that apply (MUST check at least one):  []  Is restricted by a lung disease to such a degree that the person’s forced (respiratory) expiratory volume (FEV) for 1 second, when measured by spirometry, is less than 1 liter.  []   Uses a portable oxygen device.  []   Has a Class III or Class IV cardiac condition according to the standards set by the American Heart Association.  []   Cannot walk without the use of or assistance from a wheelchair, a walker, a crutch, a brace, a prosthetic device or another person.  []   Is severely limited in the ability to walk due to an arthritic, neurological, oncological or orthopedic condition.  [x]   Cannot walk 200 feet without stopping to rest because of one of the above five conditions.  Check all that apply: (MUST check at least one diagnosis):  []Amputation of extremity(s)_________________ [x] Arthritis of the ____6 months___________  []Spina Bifida     []Osteoarthritis of the __________________   []Multiple Sclerosis    [] Chronic Pain due to ___________________  []Quadriplegia/Paraplegia   [] Legally Blind with limited mobility  [] Cerebral Palsy  [] Other Diagnosis: _________________________________________________________________    If none of the above conditions apply, list the medical condition that impacts the person’s mobility.     Medical Professional’s Printed Name*    Amber Stroud MD Specialty  Internal medicine    Office Address   [facility], [address]  [facility] [address]  Dept: [phone]  Dept Fax: [phone]   Medical Professional’s Signature   State Professional License Number (NOT NPI#)  [id number] Today’s Date                  5/22/2025    Signature of Collaborating/Supervising Physician (if signed above by resident/assistant) Supervising State Professional License Number             PART 4: Medical Eligibility for Meter-Exempt Parking  The meter-exempt parking certification must be completed only when the applicant qualifies. To qualify, the applicant MUST have a VALID  [place] 's license, have an ambulatory disability described in Part 3, and also have one of the following conditions listed below.  Economic need is not a consideration for meter-exempt parking    The applicant is eligible for meter-exempt parking as provided by statue due to the following PERMANENT medical condition or disability:    Check all that apply:  [] Cannot manage, manipulate, or insert coins, or obtain tickets in parking meters/ticket machines due to lack of fine motor control of BOTH hands.  [] Cannot reach above his/her head to a height of 42 inches from the ground due to a lack of finger, hand or upper-extremity strength or mobility.  [] Cannot approach a parking meter due to his/her use of a wheelchair or other device for mobility.  [] Cannot walk more than 20 feet due to an orthopedic, neurological, cardiovascular, or lung condition in which the degree of debilitation is so severe that it almost completely impedes the ability to walk.  [] Missing a hand(s) or arm(s) or has permanently lost the use of a hand or arm.  [] Patient is under 18 years of age and incapable of driving.     Medical Professional’s Signature State Professional License Number (NOT NPI#)   Today’s Date                  5/22/2025     Signature of Collaborating/Supervising Physician (if signed above by resident/assistant) Supervising State Professional License Number     FOR  OFFICE USE ONLY     Parking Placard Number:  Expiration Date:   Issued By: Issued Date: